# Patient Record
Sex: FEMALE | Race: NATIVE HAWAIIAN OR OTHER PACIFIC ISLANDER | NOT HISPANIC OR LATINO | URBAN - METROPOLITAN AREA
[De-identification: names, ages, dates, MRNs, and addresses within clinical notes are randomized per-mention and may not be internally consistent; named-entity substitution may affect disease eponyms.]

---

## 2019-05-30 PROCEDURE — 81003 URINALYSIS AUTO W/O SCOPE: CPT | Performed by: INTERNAL MEDICINE

## 2019-07-05 PROCEDURE — 87209 SMEAR COMPLEX STAIN: CPT | Performed by: INTERNAL MEDICINE

## 2019-07-05 PROCEDURE — 87177 OVA AND PARASITES SMEARS: CPT | Performed by: INTERNAL MEDICINE

## 2019-07-05 PROCEDURE — 87272 CRYPTOSPORIDIUM AG IF: CPT | Performed by: INTERNAL MEDICINE

## 2019-07-05 PROCEDURE — 87329 GIARDIA AG IA: CPT | Performed by: INTERNAL MEDICINE

## 2019-07-05 PROCEDURE — 83993 ASSAY FOR CALPROTECTIN FECAL: CPT | Performed by: INTERNAL MEDICINE

## 2021-02-21 ENCOUNTER — IMMUNIZATION (OUTPATIENT)
Dept: LAB | Age: 46
End: 2021-02-21

## 2021-02-21 DIAGNOSIS — Z23 NEED FOR VACCINATION: Primary | ICD-10-CM

## 2021-02-21 PROCEDURE — 0011A COVID-19 MODERNA VACCINE: CPT | Performed by: NURSE PRACTITIONER

## 2021-02-21 PROCEDURE — 91301 COVID-19 MODERNA VACCINE: CPT | Performed by: NURSE PRACTITIONER

## 2021-03-21 ENCOUNTER — IMMUNIZATION (OUTPATIENT)
Dept: LAB | Age: 46
End: 2021-03-21
Attending: HOSPITALIST

## 2021-03-21 DIAGNOSIS — Z23 NEED FOR VACCINATION: Primary | ICD-10-CM

## 2021-03-21 PROCEDURE — 0012A COVID-19 MODERNA VACCINE: CPT

## 2021-03-21 PROCEDURE — 91301 COVID-19 MODERNA VACCINE: CPT

## 2024-05-28 ENCOUNTER — LAB ENCOUNTER (OUTPATIENT)
Dept: LAB | Facility: HOSPITAL | Age: 49
DRG: 331 | End: 2024-05-28
Attending: COLON & RECTAL SURGERY
Payer: COMMERCIAL

## 2024-05-28 DIAGNOSIS — Z01.818 PRE-OP TESTING: ICD-10-CM

## 2024-05-28 LAB
ANTIBODY SCREEN: NEGATIVE
RH BLOOD TYPE: POSITIVE

## 2024-05-28 PROCEDURE — 86850 RBC ANTIBODY SCREEN: CPT

## 2024-05-28 PROCEDURE — 86901 BLOOD TYPING SEROLOGIC RH(D): CPT

## 2024-05-28 PROCEDURE — 86900 BLOOD TYPING SEROLOGIC ABO: CPT

## 2024-05-28 PROCEDURE — 36415 COLL VENOUS BLD VENIPUNCTURE: CPT

## 2024-05-30 ENCOUNTER — HOSPITAL ENCOUNTER (INPATIENT)
Facility: HOSPITAL | Age: 49
LOS: 2 days | Discharge: HOME | DRG: 331 | End: 2024-06-01
Attending: COLON & RECTAL SURGERY | Admitting: COLON & RECTAL SURGERY
Payer: COMMERCIAL

## 2024-05-30 ENCOUNTER — ANESTHESIA (OUTPATIENT)
Dept: SURGERY | Facility: HOSPITAL | Age: 49
DRG: 331 | End: 2024-05-30
Payer: COMMERCIAL

## 2024-05-30 ENCOUNTER — ANESTHESIA EVENT (OUTPATIENT)
Dept: SURGERY | Facility: HOSPITAL | Age: 49
DRG: 331 | End: 2024-05-30
Payer: COMMERCIAL

## 2024-05-30 DIAGNOSIS — B36.0 TINEA VERSICOLOR: Primary | ICD-10-CM

## 2024-05-30 DIAGNOSIS — Z01.818 PRE-OP TESTING: ICD-10-CM

## 2024-05-30 DIAGNOSIS — K63.5 POLYP OF COLON, UNSPECIFIED PART OF COLON, UNSPECIFIED TYPE: ICD-10-CM

## 2024-05-30 LAB
B-HCG UR QL: NEGATIVE
GLUCOSE BLDC GLUCOMTR-MCNC: 167 MG/DL (ref 70–99)
RH BLOOD TYPE: POSITIVE

## 2024-05-30 PROCEDURE — 0DBN4ZZ EXCISION OF SIGMOID COLON, PERCUTANEOUS ENDOSCOPIC APPROACH: ICD-10-PCS | Performed by: COLON & RECTAL SURGERY

## 2024-05-30 PROCEDURE — 82962 GLUCOSE BLOOD TEST: CPT

## 2024-05-30 PROCEDURE — 88305 TISSUE EXAM BY PATHOLOGIST: CPT | Performed by: COLON & RECTAL SURGERY

## 2024-05-30 PROCEDURE — 81025 URINE PREGNANCY TEST: CPT

## 2024-05-30 PROCEDURE — 0DJD8ZZ INSPECTION OF LOWER INTESTINAL TRACT, VIA NATURAL OR ARTIFICIAL OPENING ENDOSCOPIC: ICD-10-PCS | Performed by: COLON & RECTAL SURGERY

## 2024-05-30 PROCEDURE — 0DBP4ZZ EXCISION OF RECTUM, PERCUTANEOUS ENDOSCOPIC APPROACH: ICD-10-PCS | Performed by: COLON & RECTAL SURGERY

## 2024-05-30 PROCEDURE — 4A1BXSH MONITORING OF GASTROINTESTINAL VASCULAR PERFUSION USING INDOCYANINE GREEN DYE, EXTERNAL APPROACH: ICD-10-PCS | Performed by: COLON & RECTAL SURGERY

## 2024-05-30 PROCEDURE — 88309 TISSUE EXAM BY PATHOLOGIST: CPT | Performed by: COLON & RECTAL SURGERY

## 2024-05-30 PROCEDURE — S0028 INJECTION, FAMOTIDINE, 20 MG: HCPCS | Performed by: COLON & RECTAL SURGERY

## 2024-05-30 RX ORDER — ROCURONIUM BROMIDE 10 MG/ML
INJECTION, SOLUTION INTRAVENOUS AS NEEDED
Status: DISCONTINUED | OUTPATIENT
Start: 2024-05-30 | End: 2024-05-30 | Stop reason: SURG

## 2024-05-30 RX ORDER — LIDOCAINE HYDROCHLORIDE ANHYDROUS AND DEXTROSE MONOHYDRATE .8; 5 G/100ML; G/100ML
INJECTION, SOLUTION INTRAVENOUS CONTINUOUS PRN
Status: DISCONTINUED | OUTPATIENT
Start: 2024-05-30 | End: 2024-05-30 | Stop reason: SURG

## 2024-05-30 RX ORDER — HYDROMORPHONE HYDROCHLORIDE 1 MG/ML
0.4 INJECTION, SOLUTION INTRAMUSCULAR; INTRAVENOUS; SUBCUTANEOUS EVERY 2 HOUR PRN
Status: DISCONTINUED | OUTPATIENT
Start: 2024-05-30 | End: 2024-06-01

## 2024-05-30 RX ORDER — LEVOFLOXACIN 5 MG/ML
750 INJECTION, SOLUTION INTRAVENOUS EVERY 24 HOURS
Status: COMPLETED | OUTPATIENT
Start: 2024-05-31 | End: 2024-05-31

## 2024-05-30 RX ORDER — SODIUM CHLORIDE, SODIUM LACTATE, POTASSIUM CHLORIDE, CALCIUM CHLORIDE 600; 310; 30; 20 MG/100ML; MG/100ML; MG/100ML; MG/100ML
INJECTION, SOLUTION INTRAVENOUS CONTINUOUS PRN
Status: DISCONTINUED | OUTPATIENT
Start: 2024-05-30 | End: 2024-05-30 | Stop reason: SURG

## 2024-05-30 RX ORDER — HYDROMORPHONE HYDROCHLORIDE 1 MG/ML
0.4 INJECTION, SOLUTION INTRAMUSCULAR; INTRAVENOUS; SUBCUTANEOUS EVERY 5 MIN PRN
Status: DISCONTINUED | OUTPATIENT
Start: 2024-05-30 | End: 2024-05-30 | Stop reason: HOSPADM

## 2024-05-30 RX ORDER — LEVOFLOXACIN 5 MG/ML
500 INJECTION, SOLUTION INTRAVENOUS ONCE
Status: COMPLETED | OUTPATIENT
Start: 2024-05-30 | End: 2024-05-30

## 2024-05-30 RX ORDER — DEXAMETHASONE SODIUM PHOSPHATE 4 MG/ML
VIAL (ML) INJECTION AS NEEDED
Status: DISCONTINUED | OUTPATIENT
Start: 2024-05-30 | End: 2024-05-30 | Stop reason: SURG

## 2024-05-30 RX ORDER — PROCHLORPERAZINE EDISYLATE 5 MG/ML
5 INJECTION INTRAMUSCULAR; INTRAVENOUS EVERY 8 HOURS PRN
Status: DISCONTINUED | OUTPATIENT
Start: 2024-05-30 | End: 2024-06-01

## 2024-05-30 RX ORDER — HYDROMORPHONE HYDROCHLORIDE 1 MG/ML
0.2 INJECTION, SOLUTION INTRAMUSCULAR; INTRAVENOUS; SUBCUTANEOUS EVERY 5 MIN PRN
Status: DISCONTINUED | OUTPATIENT
Start: 2024-05-30 | End: 2024-05-30 | Stop reason: HOSPADM

## 2024-05-30 RX ORDER — MIDAZOLAM HYDROCHLORIDE 1 MG/ML
INJECTION INTRAMUSCULAR; INTRAVENOUS AS NEEDED
Status: DISCONTINUED | OUTPATIENT
Start: 2024-05-30 | End: 2024-05-30 | Stop reason: SURG

## 2024-05-30 RX ORDER — KETOROLAC TROMETHAMINE 30 MG/ML
INJECTION, SOLUTION INTRAMUSCULAR; INTRAVENOUS AS NEEDED
Status: DISCONTINUED | OUTPATIENT
Start: 2024-05-30 | End: 2024-05-30 | Stop reason: SURG

## 2024-05-30 RX ORDER — MORPHINE SULFATE 4 MG/ML
4 INJECTION, SOLUTION INTRAMUSCULAR; INTRAVENOUS EVERY 10 MIN PRN
Status: DISCONTINUED | OUTPATIENT
Start: 2024-05-30 | End: 2024-05-30 | Stop reason: HOSPADM

## 2024-05-30 RX ORDER — POLYETHYLENE GLYCOL 3350 17 G/17G
17 POWDER, FOR SOLUTION ORAL DAILY PRN
Status: DISCONTINUED | OUTPATIENT
Start: 2024-05-30 | End: 2024-06-01

## 2024-05-30 RX ORDER — ACETAMINOPHEN 500 MG
1000 TABLET ORAL ONCE
Status: COMPLETED | OUTPATIENT
Start: 2024-05-30 | End: 2024-05-30

## 2024-05-30 RX ORDER — MORPHINE SULFATE 4 MG/ML
2 INJECTION, SOLUTION INTRAMUSCULAR; INTRAVENOUS EVERY 10 MIN PRN
Status: DISCONTINUED | OUTPATIENT
Start: 2024-05-30 | End: 2024-05-30 | Stop reason: HOSPADM

## 2024-05-30 RX ORDER — FAMOTIDINE 20 MG/1
20 TABLET, FILM COATED ORAL 2 TIMES DAILY
Status: DISCONTINUED | OUTPATIENT
Start: 2024-05-30 | End: 2024-06-01

## 2024-05-30 RX ORDER — HEPARIN SODIUM 5000 [USP'U]/ML
5000 INJECTION, SOLUTION INTRAVENOUS; SUBCUTANEOUS ONCE
Status: COMPLETED | OUTPATIENT
Start: 2024-05-30 | End: 2024-05-30

## 2024-05-30 RX ORDER — HYDROMORPHONE HYDROCHLORIDE 1 MG/ML
0.8 INJECTION, SOLUTION INTRAMUSCULAR; INTRAVENOUS; SUBCUTANEOUS EVERY 2 HOUR PRN
Status: DISCONTINUED | OUTPATIENT
Start: 2024-05-30 | End: 2024-06-01

## 2024-05-30 RX ORDER — SODIUM CHLORIDE, SODIUM LACTATE, POTASSIUM CHLORIDE, CALCIUM CHLORIDE 600; 310; 30; 20 MG/100ML; MG/100ML; MG/100ML; MG/100ML
INJECTION, SOLUTION INTRAVENOUS CONTINUOUS
Status: DISCONTINUED | OUTPATIENT
Start: 2024-05-30 | End: 2024-05-30 | Stop reason: HOSPADM

## 2024-05-30 RX ORDER — OXYCODONE HYDROCHLORIDE 5 MG/1
5 TABLET ORAL EVERY 4 HOURS PRN
Status: DISCONTINUED | OUTPATIENT
Start: 2024-05-30 | End: 2024-06-01

## 2024-05-30 RX ORDER — FAMOTIDINE 10 MG/ML
20 INJECTION, SOLUTION INTRAVENOUS 2 TIMES DAILY
Status: DISCONTINUED | OUTPATIENT
Start: 2024-05-30 | End: 2024-06-01

## 2024-05-30 RX ORDER — METRONIDAZOLE 500 MG/100ML
500 INJECTION, SOLUTION INTRAVENOUS ONCE
Status: COMPLETED | OUTPATIENT
Start: 2024-05-30 | End: 2024-05-30

## 2024-05-30 RX ORDER — KETOROLAC TROMETHAMINE 30 MG/ML
30 INJECTION, SOLUTION INTRAMUSCULAR; INTRAVENOUS EVERY 6 HOURS
Status: DISCONTINUED | OUTPATIENT
Start: 2024-05-30 | End: 2024-05-31

## 2024-05-30 RX ORDER — SENNOSIDES 8.6 MG
17.2 TABLET ORAL NIGHTLY PRN
Status: DISCONTINUED | OUTPATIENT
Start: 2024-05-30 | End: 2024-06-01

## 2024-05-30 RX ORDER — METRONIDAZOLE 500 MG/100ML
500 INJECTION, SOLUTION INTRAVENOUS EVERY 8 HOURS
Status: DISCONTINUED | OUTPATIENT
Start: 2024-05-30 | End: 2024-05-30

## 2024-05-30 RX ORDER — EPHEDRINE SULFATE 50 MG/ML
INJECTION, SOLUTION INTRAVENOUS AS NEEDED
Status: DISCONTINUED | OUTPATIENT
Start: 2024-05-30 | End: 2024-05-30 | Stop reason: SURG

## 2024-05-30 RX ORDER — HYDROMORPHONE HYDROCHLORIDE 1 MG/ML
0.6 INJECTION, SOLUTION INTRAMUSCULAR; INTRAVENOUS; SUBCUTANEOUS EVERY 5 MIN PRN
Status: DISCONTINUED | OUTPATIENT
Start: 2024-05-30 | End: 2024-05-30 | Stop reason: HOSPADM

## 2024-05-30 RX ORDER — MAGNESIUM OXIDE 400 MG/1
400 TABLET ORAL DAILY
Status: DISCONTINUED | OUTPATIENT
Start: 2024-05-30 | End: 2024-06-01

## 2024-05-30 RX ORDER — NALOXONE HYDROCHLORIDE 0.4 MG/ML
0.08 INJECTION, SOLUTION INTRAMUSCULAR; INTRAVENOUS; SUBCUTANEOUS AS NEEDED
Status: DISCONTINUED | OUTPATIENT
Start: 2024-05-30 | End: 2024-05-30 | Stop reason: HOSPADM

## 2024-05-30 RX ORDER — SODIUM CHLORIDE, SODIUM LACTATE, POTASSIUM CHLORIDE, CALCIUM CHLORIDE 600; 310; 30; 20 MG/100ML; MG/100ML; MG/100ML; MG/100ML
2 INJECTION, SOLUTION INTRAVENOUS CONTINUOUS
Status: DISCONTINUED | OUTPATIENT
Start: 2024-05-30 | End: 2024-06-01

## 2024-05-30 RX ORDER — METRONIDAZOLE 500 MG/100ML
500 INJECTION, SOLUTION INTRAVENOUS EVERY 8 HOURS
Status: COMPLETED | OUTPATIENT
Start: 2024-05-30 | End: 2024-05-31

## 2024-05-30 RX ORDER — HYDROMORPHONE HYDROCHLORIDE 1 MG/ML
INJECTION, SOLUTION INTRAMUSCULAR; INTRAVENOUS; SUBCUTANEOUS AS NEEDED
Status: DISCONTINUED | OUTPATIENT
Start: 2024-05-30 | End: 2024-05-30 | Stop reason: SURG

## 2024-05-30 RX ORDER — HEPARIN SODIUM 5000 [USP'U]/ML
5000 INJECTION, SOLUTION INTRAVENOUS; SUBCUTANEOUS EVERY 8 HOURS SCHEDULED
Status: DISCONTINUED | OUTPATIENT
Start: 2024-05-31 | End: 2024-06-01

## 2024-05-30 RX ORDER — ONDANSETRON 2 MG/ML
4 INJECTION INTRAMUSCULAR; INTRAVENOUS EVERY 6 HOURS PRN
Status: DISCONTINUED | OUTPATIENT
Start: 2024-05-30 | End: 2024-06-01

## 2024-05-30 RX ORDER — SODIUM CHLORIDE, SODIUM LACTATE, POTASSIUM CHLORIDE, CALCIUM CHLORIDE 600; 310; 30; 20 MG/100ML; MG/100ML; MG/100ML; MG/100ML
INJECTION, SOLUTION INTRAVENOUS CONTINUOUS
Status: DISCONTINUED | OUTPATIENT
Start: 2024-05-30 | End: 2024-05-30

## 2024-05-30 RX ORDER — ONDANSETRON 2 MG/ML
INJECTION INTRAMUSCULAR; INTRAVENOUS AS NEEDED
Status: DISCONTINUED | OUTPATIENT
Start: 2024-05-30 | End: 2024-05-30 | Stop reason: SURG

## 2024-05-30 RX ORDER — MORPHINE SULFATE 10 MG/ML
6 INJECTION, SOLUTION INTRAMUSCULAR; INTRAVENOUS EVERY 10 MIN PRN
Status: DISCONTINUED | OUTPATIENT
Start: 2024-05-30 | End: 2024-05-30 | Stop reason: HOSPADM

## 2024-05-30 RX ORDER — LIDOCAINE HYDROCHLORIDE 10 MG/ML
INJECTION, SOLUTION EPIDURAL; INFILTRATION; INTRACAUDAL; PERINEURAL AS NEEDED
Status: DISCONTINUED | OUTPATIENT
Start: 2024-05-30 | End: 2024-05-30 | Stop reason: SURG

## 2024-05-30 RX ORDER — OXYCODONE HYDROCHLORIDE 5 MG/1
10 TABLET ORAL EVERY 4 HOURS PRN
Status: DISCONTINUED | OUTPATIENT
Start: 2024-05-30 | End: 2024-06-01

## 2024-05-30 RX ADMIN — SODIUM CHLORIDE, SODIUM LACTATE, POTASSIUM CHLORIDE, CALCIUM CHLORIDE: 600; 310; 30; 20 INJECTION, SOLUTION INTRAVENOUS at 08:17:00

## 2024-05-30 RX ADMIN — KETOROLAC TROMETHAMINE 30 MG: 30 INJECTION, SOLUTION INTRAMUSCULAR; INTRAVENOUS at 12:32:00

## 2024-05-30 RX ADMIN — SODIUM CHLORIDE, SODIUM LACTATE, POTASSIUM CHLORIDE, CALCIUM CHLORIDE: 600; 310; 30; 20 INJECTION, SOLUTION INTRAVENOUS at 11:30:00

## 2024-05-30 RX ADMIN — EPHEDRINE SULFATE 10 MG: 50 INJECTION, SOLUTION INTRAVENOUS at 09:00:00

## 2024-05-30 RX ADMIN — EPHEDRINE SULFATE 5 MG: 50 INJECTION, SOLUTION INTRAVENOUS at 08:58:00

## 2024-05-30 RX ADMIN — EPHEDRINE SULFATE 15 MG: 50 INJECTION, SOLUTION INTRAVENOUS at 08:52:00

## 2024-05-30 RX ADMIN — ONDANSETRON 4 MG: 2 INJECTION INTRAMUSCULAR; INTRAVENOUS at 08:22:00

## 2024-05-30 RX ADMIN — MIDAZOLAM HYDROCHLORIDE 2 MG: 1 INJECTION INTRAMUSCULAR; INTRAVENOUS at 08:18:00

## 2024-05-30 RX ADMIN — DEXAMETHASONE SODIUM PHOSPHATE 8 MG: 4 MG/ML VIAL (ML) INJECTION at 08:22:00

## 2024-05-30 RX ADMIN — HYDROMORPHONE HYDROCHLORIDE 0.5 MG: 1 INJECTION, SOLUTION INTRAMUSCULAR; INTRAVENOUS; SUBCUTANEOUS at 11:52:00

## 2024-05-30 RX ADMIN — SODIUM CHLORIDE, SODIUM LACTATE, POTASSIUM CHLORIDE, CALCIUM CHLORIDE: 600; 310; 30; 20 INJECTION, SOLUTION INTRAVENOUS at 11:50:00

## 2024-05-30 RX ADMIN — ROCURONIUM BROMIDE 10 MG: 10 INJECTION, SOLUTION INTRAVENOUS at 10:38:00

## 2024-05-30 RX ADMIN — LEVOFLOXACIN 500 MG: 5 INJECTION, SOLUTION INTRAVENOUS at 08:17:00

## 2024-05-30 RX ADMIN — ROCURONIUM BROMIDE 20 MG: 10 INJECTION, SOLUTION INTRAVENOUS at 08:32:00

## 2024-05-30 RX ADMIN — SODIUM CHLORIDE, SODIUM LACTATE, POTASSIUM CHLORIDE, CALCIUM CHLORIDE: 600; 310; 30; 20 INJECTION, SOLUTION INTRAVENOUS at 08:24:00

## 2024-05-30 RX ADMIN — EPHEDRINE SULFATE 5 MG: 50 INJECTION, SOLUTION INTRAVENOUS at 08:54:00

## 2024-05-30 RX ADMIN — LIDOCAINE HYDROCHLORIDE ANHYDROUS AND DEXTROSE MONOHYDRATE 1 MG/MIN: .8; 5 INJECTION, SOLUTION INTRAVENOUS at 09:00:00

## 2024-05-30 RX ADMIN — LIDOCAINE HYDROCHLORIDE 50 MG: 10 INJECTION, SOLUTION EPIDURAL; INFILTRATION; INTRACAUDAL; PERINEURAL at 08:22:00

## 2024-05-30 RX ADMIN — METRONIDAZOLE 500 MG: 500 INJECTION, SOLUTION INTRAVENOUS at 08:20:00

## 2024-05-30 RX ADMIN — EPHEDRINE SULFATE 15 MG: 50 INJECTION, SOLUTION INTRAVENOUS at 08:56:00

## 2024-05-30 RX ADMIN — ROCURONIUM BROMIDE 10 MG: 10 INJECTION, SOLUTION INTRAVENOUS at 09:54:00

## 2024-05-30 RX ADMIN — SODIUM CHLORIDE, SODIUM LACTATE, POTASSIUM CHLORIDE, CALCIUM CHLORIDE: 600; 310; 30; 20 INJECTION, SOLUTION INTRAVENOUS at 08:52:00

## 2024-05-30 RX ADMIN — ROCURONIUM BROMIDE 20 MG: 10 INJECTION, SOLUTION INTRAVENOUS at 09:02:00

## 2024-05-30 RX ADMIN — ROCURONIUM BROMIDE 10 MG: 10 INJECTION, SOLUTION INTRAVENOUS at 11:30:00

## 2024-05-30 NOTE — ANESTHESIA PREPROCEDURE EVALUATION
Anesthesia PreOp Note    HPI:     Roshni Pierce is a 48 year old female who presents for preoperative consultation requested by: Titi Chen MD    Date of Surgery: 2024    Procedure(s):  Proctoscopy, laparoscopic sigmoid resection, possible open laparotomy  Exploratory laparotomy  Proctoscopy  Indication: Sigmoid cancer, sigmoid polyp    Relevant Problems   No relevant active problems       NPO:  Last Liquid Consumption Date: 24  Last Liquid Consumption Time: 515  Last Solid Consumption Date: 24  Last Solid Consumption Time:   Last Liquid Consumption Date: 24          History Review:  Patient Active Problem List    Diagnosis Date Noted    Tinea versicolor 2014       Past Medical History:    Family history of premature CAD    father - 53    IBS (irritable bowel syndrome)    Diarrhea predominant. Celiac panel and CRP neg. GI Hung. trial low fodmap diet    Visual impairment       Past Surgical History:   Procedure Laterality Date    Colonoscopy                        Repair of nasal septum      deviated    Tonsillectomy  1977    [place] teeth removed  1989    x4       Medications Prior to Admission   Medication Sig Dispense Refill Last Dose    ketoconazole 2 % External Cream Apply twice per day 30 g 0 More than a month    Probiotic Product (PROBIOTIC-10 OR) Take 1 tablet by mouth daily.   More than a month    Vitamin D3 25 MCG (1000 UT) Oral Tab Take 1 tablet (1,000 Units total) by mouth daily as needed (in winter).   More than a month     Current Facility-Administered Medications Ordered in Epic   Medication Dose Route Frequency Provider Last Rate Last Admin    lactated ringers infusion   Intravenous Continuous Titi Chen MD 20 mL/hr at 24 0606 New Bag at 24 0606    levoFLOXacin in dextrose 5% (Levaquin) 500 mg/100mL IVPB premix 500 mg  500 mg Intravenous Once Titi Chen MD        metRONIDAZOLE in sodium chloride 0.79% (Flagyl) 5  mg/mL IVPB premix 500 mg  500 mg Intravenous Once Gustavo, MD Titi         No current [facility]-ordered outpatient medications on file.       Allergies   Allergen Reactions    Penicillins RASH     No skin blisters, no organ involvement       Family History   Problem Relation Age of Onset    Heart Attack Father 53    Anxiety Father         related to son's death    Heart Disorder Mother 67        CABG    No Known Problems Daughter     No Known Problems Son     Asthma Maternal Grandmother     Heart Attack Maternal Grandfather         68    Breast Cancer Paternal Grandmother 80    Heart Attack Paternal Grandfather 79    Anxiety Sister         panic attack    Diabetes Brother         type 1    Other (Glioblastoma) Brother 39    No Known Problems Sister     No Known Problems Brother     Breast Cancer Paternal Aunt 52     Social History     Socioeconomic History    Marital status:    Occupational History    Occupation: teacher HS     Comment: sub   Tobacco Use    Smoking status: Never    Smokeless tobacco: Never   Vaping Use    Vaping status: Never Used   Substance and Sexual Activity    Alcohol use: Yes     Alcohol/week: 3.0 - 5.0 standard drinks of alcohol     Types: 3 - 5 Standard drinks or equivalent per week     Comment: 1 drink 3-5 times/wk    Drug use: Never    Sexual activity: Yes     Partners: Male     Comment: 1/yr, no sti   Other Topics Concern     Service No    Blood Transfusions No    Caffeine Concern No     Comment: 1/d    Occupational Exposure No    Hobby Hazards No    Sleep Concern No    Stress Concern No    Weight Concern No    Special Diet No    Back Care No    Exercise Yes     Comment: cardio, trx, pickle ball/tennis, resistance training daily    Bike Helmet No    Seat Belt No    Self-Exams Yes       Available pre-op labs reviewed.  Lab Results   Component Value Date    URINEPREG Negative 05/30/2024             Vital Signs:  Body mass index is 19.08 kg/m².   height is 1.689 m (5' 6.5\") and  weight is 54.4 kg (120 lb). Her oral temperature is 99.1 °F (37.3 °C). Her blood pressure is 139/86 and her pulse is 100. Her respiration is 18 and oxygen saturation is 99%.   Vitals:    05/22/24 1630 05/30/24 0600   BP:  139/86   Pulse:  100   Resp:  18   Temp:  99.1 °F (37.3 °C)   TempSrc:  Oral   SpO2:  99%   Weight: 55.8 kg (123 lb) 54.4 kg (120 lb)   Height: 1.689 m (5' 6.5\") 1.689 m (5' 6.5\")        Anesthesia Evaluation     Patient summary reviewed and Nursing notes reviewed    Airway   Mallampati: II  TM distance: >3 FB  Neck ROM: full  Dental      Pulmonary - negative ROS and normal exam   Cardiovascular - negative ROS and normal exam  Exercise tolerance: good    Neuro/Psych    (+)  anxiety/panic attacks,        GI/Hepatic/Renal      Comments: Irritable bowel syndrome.  Sigmoid polyp    Endo/Other - negative ROS   Abdominal  - normal exam                 Anesthesia Plan:   ASA:  2  Plan:   General  Airway:  ETT  Informed Consent Plan and Risks Discussed With:  Patient  Use of Blood Products Discussed With:  Patient  Blood Product Use Consented        I have informed Roshni Pierce and/or legal guardian or family member of the nature of the anesthetic plan, benefits, risks including possible dental damage if relevant, major complications, and any alternative forms of anesthetic management.   All of the patient's questions were answered to the best of my ability. The patient desires the anesthetic management as planned.  Jailyn Sosa MD  5/30/2024 7:16 AM  Present on Admission:  **None**

## 2024-05-30 NOTE — ANESTHESIA PROCEDURE NOTES
Airway  Date/Time: 5/30/2024 8:23 AM  Urgency: elective    Airway not difficult    General Information and Staff    Patient location during procedure: OR  Anesthesiologist: Jailyn Sosa MD  Performed: anesthesiologist   Performed by: Jailyn Sosa MD  Authorized by: Jailyn Sosa MD      Indications and Patient Condition  Indications for airway management: anesthesia  Spontaneous Ventilation: absent  Sedation level: deep  Preoxygenated: yes  Patient position: sniffing  MILS not maintained throughout  Mask difficulty assessment: 0 - not attempted    Final Airway Details  Final airway type: endotracheal airway      Successful airway: ETT  Cuffed: yes   Successful intubation technique: direct laryngoscopy  Facilitating devices/methods: rapid sequence intubation  Endotracheal tube insertion site: oral  Blade: Ashwini  Blade size: #4  ETT size (mm): 7.0    Cormack-Lehane Classification: grade I - full view of glottis  Placement verified by: capnometry   Cuff volume (mL): 5  Measured from: lips  ETT to lips (cm): 22  Number of attempts at approach: 1  Number of other approaches attempted: 0

## 2024-05-30 NOTE — H&P
On license of [facility] and [facility]  Office Visit - Colon and Rectal Surgery  Titi Chen MD      CHIEF COMPLAINT: Patient presents for surgery for colon polyp      HISTORY OF PRESENT ILLNESS:  Roshni Pierce is a 48 year old female who presents for surgery for colon polyp.    INTERVAL HISTORY   She has completed CT C/A/P and is here today for rigid proctoscopy to localize lesion.  She has not developed any new symptoms since last visit.    INITIAL HISTORY (2024)  She recently underwent routine colonoscopy for colon cancer screening last week.  A polyp was noted in the distal sigmoid colon and was not amenable to endoscopic removal.  She has a history of IBS in the last several years.  She has not noted change in bowel habits, abdominal pain or blood in stool.    Bowel habits:   Frequency: twice each morning (small and loose and then normally formed)  Another 1-2 BMs at night when stressed  Consistency: loose  Continence: occasional incontinence if stool really loose later in the evening    HISTORY:  Past Medical History:   Diagnosis Date   Anxiety   COVID    Family history of breast cancer in sister   55 - not genetic, PGM and Paunt w/breast ca   Family history of premature CAD   father - 53   IBS (irritable bowel syndrome)   Diarrhea predominant. Celiac panel and CRP neg. GI Hung. trial low fodmap diet     Past Surgical History:   Procedure Laterality Date   COLONOSCOPY N/A 3/25/2024   Procedure: COLONOSCOPY, with polypectomy; Surgeon: Sunny Naqvi MD; Location: [facility]               REPAIR OF NASAL SEPTUM 1992   deviated   SINUS SURGERY   TONSILLECTOMY 1977   [place] TEETH REMOVED 1989   x4     Family History   Problem Relation Age of Onset   Heart Disorder Mother 67   CABG   Heart Attack Father 53   Anxiety Father   related to son's death   Anxiety Sister   panic attack   Breast Cancer Sister 56   not genetic stage 1A - just surgery   Diabetes Brother   type 1   Other  (Glioblastoma) Brother 39   No Known Problems Brother   Asthma Maternal Grandmother   Heart Attack Maternal Grandfather   68   Breast Cancer Paternal Grandmother 80   Heart Attack Paternal Grandfather 79   No Known Problems Daughter   No Known Problems Son   Breast Cancer Paternal Aunt 52     Social History  Tobacco Use  Smoking status: Never  Smokeless tobacco: Never  Vaping Use  Vaping Use: Never used  Alcohol use: Yes  Alcohol/week: 3.0 - 5.0 standard drinks of alcohol  Types: 3 - 5 Standard drinks or equivalent per week  Comment: 1 drink 3-5 times/wk  Drug use: Never      CURRENT MEDICATIONS:   Current Outpatient Medications   Medication Sig Dispense Refill   neomycin 500 MG Oral Tab Take 2PO the day before surgery at 5,7,9 PM 6 tablet 0   metRONIDAZOLE (FLAGYL) 500 MG Oral Tab Take 1PO the day before surgery at 5,7,9 PM 3 tablet 0   zolpidem 5 MG Oral Tab Take 1 tablet (5 mg total) by mouth nightly as needed for Sleep. 20 tablet 0   ketoconazole 2 % External Cream Apply twice per day 30 g 0   Probiotic Product (PROBIOTIC-10 OR) Take 1 tablet by mouth daily.   Vitamin D3 25 MCG (1000 UT) Oral Tab Take 1,000 Units by mouth daily as needed (in winter).         ALLERGIES:  Penicillins    REVIEW OF SYSTEMS:  Constitutional: normal  Eyes: corrective lenses  Ears/Nose/Throat: normal  Respiratory: normal  Cardiac/Vascular: normal  GI: see HPI  : normal  Musculoskeletal: normal  Skin: normal  Neurologic: normal  Psychiatric: occasional anxiety  Endocrine: normal  Hematology/Lymphatics: normal  Allergy/Immunology: normal    PHYSICAL EXAM:   Ht 5' 6.5\" (1.689 m)  Wt 125 lb (56.7 kg)  BMI 19.87 kg/m²   Body mass index is 19.87 kg/m².    CONSTITUTIONAL: awake, alert, cooperative, no apparent distress, and appears stated age  EYES: Lids and lashes normal, sclera clear, conjunctiva normal  ENT: Normocephalic, without obvious abnormality, atraumatic  NECK: Supple, symmetrical, trachea midline, no adenopathy, thyroid  symmetric, not enlarged and no tenderness  HEMATOLOGIC/LYMPHATICS: No cervical lymphadenopathy, no supraclavicular lymphadenopathy, no inguinal lymphadenopathy  LUNGS: No increased work of breathing, good air exchange, clear to auscultation bilaterally, no crackles or wheezing  CARDIOVASCULAR: Normal apical impulse, regular rate and rhythm, and no murmur noted, no pedal edema  ABDOMEN: No scars, normal bowel sounds, soft, non-distended, non-tender, no masses palpated, no hepatosplenomegaly    Procedure: Rigid proctoscopy    Indication: Rectal cancer     Technique:   Patient placed in kneeling position. Digital rectal examination was performed. The rigid proctoscope was inserted to 15 cm. Careful inspection of the mucosa was made during withdrawal of the proctoscope. Bowel preparation was excellent. The patient tolerated the procedure well.     Findings:   RECTAL:  External skin is normal  Anal tags are not noted  Prolapse is not noted with straining  Resting tone is normal  Squeeze tone is normal  Mass is not palpable    PROCTOSCOPY:  Begins 13 cm above verge  Beginning of polyp seen on anterior wall, occupied half circumference  Tattoo noted distal to mass    MUSCULOSKELETAL: Full range of motion noted. Motor strength is 5 out of 5 all extremities bilaterally.   SKIN: no rashes and no jaundice  PSYCHIATRIC:   Orientation: normal  Appearance: normal  Behavior: normal  Attitude toward examiner: normal  Affect: normal  Judgment: Normal    DATA:   COLONOSCOPY 3/25/2024 - Dr. Naqvi  PREOPERATIVE DIAGNOSIS  colon cancer screening   POSTOPERATIVE DIAGNOSIS:  5 mm cecum polyp and 7 mm sigmoid colon polyp, both removed via cold biopsy forceps.  Circumferential mass, nonobstructing, soft consistency on biopsies, at 15 cm at the rectosigmoid junction. The mass was approximately 2 - 3 cm in length, and too broad based with extensive superficial mucosal involvement of the surrounding tissue, to allow for endoscopic resection.    1.5 ml of ink injected to naye the rectosigmoid site for future surgical resection.  PROCEDURE AND FINDINGS:...   Findings included 5 mm cecum polyp and 7 mm sigmoid colon polyp, both removed via cold biopsy forceps. There was a circumferential mass, nonobstructing, soft consistency on biopsies, at 15 cm at the rectosigmoid junction. The mass was approximately 2 - 3 cm in length, and too broad based with extensive superficial mucosal involvement of the surrounding tissue, to allow for endoscopic resection. 1.5 ml of ink injected to naye the rectosigmoid site for future surgical resection. On retroflexion of the scope in the anorectum, normal internal hemorrhoids were noted.   RECOMMENDATIONS   1. The patient will be provided with a written summary of today's endoscopic findings.  2. The patient will be notified with biopsy results in 2 - 4 working days.   3. Referral given for surgical evaluation for resection of the rectosigmoid mass.    Final Diagnosis   A. Cecal polyp:  -Tubular adenoma.  -Negative for malignancy.    B. Sigmoid colon polyp:  -Tubular adenoma.  -Negative for malignancy.    C. Rectosigmoid junction mass at 15 cm biopsy:  -Tubulovillous adenoma with focal, superficial high grade dysplasia. See comment      Electronically signed by Cullen Desai MD on 4/3/2024 at 9:32 AM   Comments [facility]   Additional levels of the rectosigmoid mass biopsy are reviewed. The biopsy shows fragments of tubulovillous adenoma with focal, superficial high grade dysplasia. There is no evidence of invasive carcinoma in the submitted material. However, since this is a superficial biopsy of a larger mass lesion, a more severe unsampled process cannot be excluded. Clinical and endoscopic correlation is suggested.     CEA  4/02/2024 - 0.7    CT C/A/P 4/11/2024  COMBINED IMPRESSION:   1. Moderate segmental thickening of the distal sigmoid colon, in keeping with the documented mass   seen on colonoscopy.   2. No  evidence of metastatic disease to the chest, abdomen, or pelvis.     ASSESSMENT AND PLAN:  Colon polyp  Proximal rectum at 13 cm on rigid proctoscopy    She is ready to proceed with laparoscopic LAR, possible stoma (stoma not likely needed as anastomosis expected to be proximal to anterior peritoneal reflection)    Procedure, indications, risks, benefits, and alternatives discussed with patient and . All questions answered. They understand and she wishes to proceed.      Prior A/P  Colon polyp,   Sigmoid colon at 15 cm  Circumferential, soft  Biopsy shows TVA, malignancy cannot be excluded on superficial biopsies    Advise  CT C/A/P to assess primary mass and potential metastatic disease  If primary site not visible, will need rigid proctoscopy to confirm lesion is proximal to rectum for surgical planning  Anticipate she will need laparoscopic sigmoid colectomy        The patient was educated regarding the condition, treatment options, and instructed in the above treatment plan.    Titi Chen MD, MD, FACS, FASCRS

## 2024-05-30 NOTE — CONSULTS
[facility]  part of [facility]     [facility] Hospitalist Consult note     CC: Post op management     PCP: Denis Martines MD    Consult from Dr Chen, medical management     Assessment and Plan       Roshni Pierce is a 48 year old female with PMH sig for IBS, family history of CAD, and recent diagnosis of sigmoid cancer who presented for laparoscopic sigmoid colectomy and anastomosis.      Sigmoid polyp/ malignancy with path showing high grade dysplasia  -await final path from sigmoid colectomy with primary anastomosis  -monitor expected Acute blood loss anemia,preop hgb 13.3  -diet per surgery  -prn IV and po pain meds for post op pain control    FEN: IVF per protocol, lytes in AM, diet per surgery    PPX; SCd, HSQ    Dispo full code, pending course     Outpatient records reviewed confirming patient's medical history and medications.     Further recommendations pending patient's clinical course.  [facility] hospitalist to continue to follow patient while in house    Patient and/or patient's family given opportunity to ask questions and note understanding and agreeing with therapeutic plan as outlined    Note: This chart was prepared using voice recognition software and may contain unintended word substitution errors.         Thank You,  Kassie Sidhu MD 5/30/24  [facility] Hospitalist     Answering Service number: [phone]    HPI     Roshni Pierce is a 48 year old female with PMH sig for IBS, family history of CAD, and recent diagnosis of sigmoid cancer who presented for laparoscopic sigmoid colectomy and anastomosis.      She had routine screening colonoscopy in April 2024 and had large rectosigmoid polyp that was not amenable to removal and pathology showing superficial high-grade dysplasia.  Presented today for removal.  Postop patient without nausea or vomiting.  No chest pain or shortness of breath.  Pain currently controlled with IV pain meds.    Review of Systems  12 point systems reviewed,  please see HPI for pertinent positives, otherwise negative    History     PMH  Past Medical History:    Family history of premature CAD    father - 53    IBS (irritable bowel syndrome)    Diarrhea predominant. Celiac panel and CRP neg. GI Hung. trial low fodmap diet    Visual impairment        PSH  Past Surgical History:   Procedure Laterality Date    Colonoscopy        2003      2005      2009    Repair of nasal septum  1992    deviated    Tonsillectomy  1977    [place] teeth removed  1989    x4        ALL:  Allergies   Allergen Reactions    Penicillins RASH     No skin blisters, no organ involvement        Home Medications:  No outpatient medications have been marked as taking for the 24 encounter (Hospital Encounter).       Soc Hx  Social History     Tobacco Use    Smoking status: Never    Smokeless tobacco: Never   Substance Use Topics    Alcohol use: Yes     Alcohol/week: 3.0 - 5.0 standard drinks of alcohol     Types: 3 - 5 Standard drinks or equivalent per week     Comment: 1 drink 3-5 times/wk        Fam Hx  Family History   Problem Relation Age of Onset    Heart Attack Father 53    Anxiety Father         related to son's death    Heart Disorder Mother 67        CABG    No Known Problems Daughter     No Known Problems Son     Asthma Maternal Grandmother     Heart Attack Maternal Grandfather         68    Breast Cancer Paternal Grandmother 80    Heart Attack Paternal Grandfather 79    Anxiety Sister         panic attack    Diabetes Brother         type 1    Other (Glioblastoma) Brother 39    No Known Problems Sister     No Known Problems Brother     Breast Cancer Paternal Aunt 52           Objective     Temp: 98.3 °F (36.8 °C)  Pulse: 97  Resp: 13  BP: 108/62    Exam  Gen: No acute distress, alert and oriented x3  Neck Supple, no JVD  Pulm: Lungs clear, normal respiratory effort, No wheezing or crackles  CV: Heart with regular rate and rhythm, No murmurs, rubs, gallops  Abd: Abdomen soft,  tender as expected, hypoactive BS   MSK:   no clubbing, no cyanosis.  No Lower extremity edema  Skin: no rashes or lesions, well perfused  Psych: mood stable, cooperative  Neuro: No focal deficits    Diagnostic Data:    CBC/Chem  No results for input(s): \"WBC\", \"HGB\", \"MCV\", \"PLT\", \"BAND\", \"INR\" in the last 168 hours.    Invalid input(s): \"LYM#\", \"MONO#\", \"BASOS#\", \"EOSIN#\"    No results for input(s): \"NA\", \"K\", \"CL\", \"CO2\", \"BUN\", \"CREATSERUM\", \"GLU\", \"CA\", \"CAION\", \"MG\", \"PHOS\" in the last 168 hours.    No results for input(s): \"ALT\", \"AST\", \"ALB\", \"AMYLASE\", \"LIPASE\", \"LDH\" in the last 168 hours.    Invalid input(s): \"ALPHOS\", \"TBIL\", \"DBIL\", \"TPROT\"    No results for input(s): \"TROP\" in the last 168 hours.         Radiology: No results found.

## 2024-05-30 NOTE — ANESTHESIA POSTPROCEDURE EVALUATION
Patient: Roshni Pierce    Procedure Summary       Date: 05/30/24 Room / Location: [facility] MAIN OR 04 / [facility] MAIN OR    Anesthesia Start: 0816 Anesthesia Stop: 1313    Procedure: Proctoscopy, laparoscopic sigmoid resection (Abdomen) Diagnosis: (Sigmoid cancer, sigmoid polyp)    Surgeons: Titi Chen MD Anesthesiologist: Jailyn Sosa MD    Anesthesia Type: general ASA Status: 2            Anesthesia Type: general    Vitals Value Taken Time   /62 05/30/24 1333   Temp 98.3 °F (36.8 °C) 05/30/24 1309   Pulse 94 05/30/24 1336   Resp 15 05/30/24 1336   SpO2 100 % 05/30/24 1336   Vitals shown include unfiled device data.    [facility] AN Post Evaluation:   Patient Evaluated in PACU  Patient Participation: complete - patient participated  Level of Consciousness: awake and alert  Pain Score: 0  Pain Management: adequate  Airway Patency:patent  Yes    Nausea/Vomiting: none  Cardiovascular Status: acceptable  Respiratory Status: acceptable  Postoperative Hydration acceptable      Jailyn Sosa MD  5/30/2024 1:37 PM

## 2024-05-30 NOTE — OPERATIVE REPORT
Operative Note    Patient Name: Roshni Pierce    Date of Surgery: 05/30/24     Preoperative Diagnosis: Sigmoid cancer, sigmoid polyp    Postoperative Diagnosis: same    Primary Surgeon: Titi Chen MD    Assistant: Mr. Jessica Ugarte    Procedures: Laparoscopic sigmoid colectomy, colorectal anastomosis,   mobilization of splenic flexure, in vivo fluorescence angioagraphy    Surgical Findings: large polyp in proximal rectum, 5 cm distal margin    Anesthesia: General    Complications: none    Implants: none    Specimen: Sigmoid colon, rectum ,and distal donut    Drains: none    Condition: good    Estimated Blood Loss: 10 mL    DESCRIPTION OF PROCEDURE    INDICATION   The patient is a 48 year old year old female with a large colon polyp in region of rectosigmoid junction.     The procedure, indications, risks, benefits, and alternatives were discussed with the patient prior to the procedure. All questions were answered, and the patient wished to proceed.    TECHNIQUE  The patient was taken to the operating room and placed supine on the operating table. General anesthesia was induced and he was then repositioned in modified lithotomy using Vivek Flakito stirrups with appropriate attention to all joints and pressure points. The abdomen was prepared with Chloraprep and draped in the usual sterile fashion. Timeout was called to reconfirm the patient's identity, diagnosis, planned procedure, and completeness of preoperative preparations.    The procedure began with placement of a Giana cannula at the umbilicus. This was done using an open technique. The abdomen was safely entered without injury to underlying viscera. A 0-Vicryl pursestring suture was placed at this location to allow for maintenance of pneumoperitoneum and later for closure of the fascia at the end of the operation. Additional cannulas were placed. Three 5 mm cannulas were placed with 1 in the left lower quadrant and 2 on the right side of the  abdomen. We then placed our last cannula, 12 mm size, in the suprapubic position.    We then placed the patient in Trendelenburg with the right side down. Transverse colon was placed up into the upper abdomen. Survey of the abdomen did not demonstrate any evidence of carcinomatosis or ascites. The mesentery to the sigmoid colon was identified. An incision was made at the sacral promontory and then carried up over the inferior mesenteric artery pedicle and towards the ligament of Treitz on the left lateral aspect. The Enseal was used to dissect through the tissue and divide with hemostasis. We dissected the left mesocolon off of the retroperitoneum and were able to identify the left ureter and gonadal vessels which were pushed posteriorly free from injury. The MELCHOR pedicle was then dissected and we were able to divide the MELCHOR using Enseal with excellent hemostasis just distal to the takeoff of the left colic artery. The IMV was divided at this level as well. We then continued dissection underneath the sigmoid mesocolon laterally to the abdominal sidewall and cephalad over the left kidney to reach a point under the splenic flexure.    Having completed the medial phase of dissection, we then divided the lateral peritoneal attachments at the white line of Toldt. The splenic flexure was then taken down sharply with use of the Enseal without injury to the spleen or tail of the pancreas. The mesentery to the splenic flexure was preserved. Having completed the abdominal phase of the operation, attention was then turned towards the pelvic phase of the operation.    The lateral peritoneal attachments of the rectum were divided down to the upper third of the rectum. This allowed us to reach to a point in the proximal rectum just distal to the confluent layer of tenia. The mesorectum was elevated off of the presacral space by bluntly dissecting in the plane between the fascia propria of the rectum and Waldeyer fascia. Once the  rectum was circumferentially mobilized , the mesorectum was divided at this level using Enseal. We then divided the rectum at this level using the Insignia 45 stapler with 2 firings.    The proximal margin which easily reached down to the pelvis was identified and marked. The remaining mesentery and the proximal colon at the proximal line of resection was divided with the Enseal.  ICG fluorescence angiography was performed using Helpr system. Excellent perfusion was noted at the proposed/prepared proximal and distal lines of resection.     ICG fluorescence angiography was repeated after the anastomosis was completed and again showed excellent perfusion to proximal and distal limbs of the anastomosis.    The specimen was extracted out through the suprapubic incision which was lengthened along the existing scar. The Gilberto wound retractor was placed and then the specimen was exteriorized. The colon was then divided and a 29 mm anvil was placed through the open end of the colon and brought out through a colotomy on the antimesenteric wall of the colon. The open end of the colon was then stapled closed using another firing of the [place] stapler. A 3-0 Vicryl U-stitch was placed at the stem of anvil to help support it during docking. The proximal colon was irrigated and returned to the abdominal cavity.    The pneumoperitoneum was reestablished, and the proximal colon was oriented to lie without torsion. The cartridge of the 29 mm circular stapler was advanced transanally up to the top of the rectal stump under laparoscopic vision. The spike of the cartridge was advanced through the staple lines in the rectal stump. The anvil and cartridge were mated and closed. Fifteen seconds were allowed to pass to allow tissue compression and optimal staple formation. The orientation of the mesentery to reach the pelvis without tension or torsion was again confirmed, as well as ensuring that there was no small bowel herniating under  the left colon. After all inspections were satisfactory, the circular stapler was fired. Both donuts were inspected and found to be circumferentially intact. The anastomosis was then tested with air insufflation under saline submersion using the proctoscope. With proximal compression, the anastomosis distended well and transanal passage of gas occurred around the anastomosis tested. There were no air bubbles, and therefore, a secure anastomosis had been achieved.     We then removed the cannulas and closed the incisions. The transverse suprapubic incision was closed using running #1 PDS suture. The umbilical incision was closed by tying the previously placed 0 Vicryl pursestring suture. Subcutaneous tissues were irrigated with saline and then hemostasis secured with cautery. Skin was closed using 4-0 Vicryl subcuticular sutures. The skin was cleansed and Dermabond was applied.     The patient was then allowed to emerge from anesthesia without incident. The patient was extubated in the operating room and taken to the recovery room in satisfactory condition.    (Mr. Jessica Ugarte's skilled assistance was necessary for patient positioning, prepping, instrument passing and holding, retraction, suturing, and camera holding.)      _____________________________________   Attending Surgeon: Titi Chen MD   Dictated By: iTti Chen MD

## 2024-05-31 LAB
ANION GAP SERPL CALC-SCNC: 6 MMOL/L (ref 0–18)
BASOPHILS # BLD AUTO: 0.02 X10(3) UL (ref 0–0.2)
BASOPHILS NFR BLD AUTO: 0.2 %
BUN BLD-MCNC: <5 MG/DL (ref 9–23)
CALCIUM BLD-MCNC: 8.9 MG/DL (ref 8.7–10.4)
CHLORIDE SERPL-SCNC: 110 MMOL/L (ref 98–112)
CO2 SERPL-SCNC: 26 MMOL/L (ref 21–32)
CREAT BLD-MCNC: 0.8 MG/DL
DEPRECATED RDW RBC AUTO: 40.7 FL (ref 35.1–46.3)
EGFRCR SERPLBLD CKD-EPI 2021: 90 ML/MIN/1.73M2 (ref 60–?)
EOSINOPHIL # BLD AUTO: 0 X10(3) UL (ref 0–0.7)
EOSINOPHIL NFR BLD AUTO: 0 %
ERYTHROCYTE [DISTWIDTH] IN BLOOD BY AUTOMATED COUNT: 12.4 % (ref 11–15)
GLUCOSE BLD-MCNC: 118 MG/DL (ref 70–99)
HCT VFR BLD AUTO: 35.2 %
HGB BLD-MCNC: 11.8 G/DL
IMM GRANULOCYTES # BLD AUTO: 0.03 X10(3) UL (ref 0–1)
IMM GRANULOCYTES NFR BLD: 0.3 %
LYMPHOCYTES # BLD AUTO: 1.45 X10(3) UL (ref 1–4)
LYMPHOCYTES NFR BLD AUTO: 14.1 %
MAGNESIUM SERPL-MCNC: 1.5 MG/DL (ref 1.6–2.6)
MCH RBC QN AUTO: 30.3 PG (ref 26–34)
MCHC RBC AUTO-ENTMCNC: 33.5 G/DL (ref 31–37)
MCV RBC AUTO: 90.3 FL
MONOCYTES # BLD AUTO: 0.91 X10(3) UL (ref 0.1–1)
MONOCYTES NFR BLD AUTO: 8.9 %
NEUTROPHILS # BLD AUTO: 7.86 X10 (3) UL (ref 1.5–7.7)
NEUTROPHILS # BLD AUTO: 7.86 X10(3) UL (ref 1.5–7.7)
NEUTROPHILS NFR BLD AUTO: 76.5 %
PHOSPHATE SERPL-MCNC: 2.7 MG/DL (ref 2.4–5.1)
PLATELET # BLD AUTO: 198 10(3)UL (ref 150–450)
POTASSIUM SERPL-SCNC: 4 MMOL/L (ref 3.5–5.1)
RBC # BLD AUTO: 3.9 X10(6)UL
SODIUM SERPL-SCNC: 142 MMOL/L (ref 136–145)
WBC # BLD AUTO: 10.3 X10(3) UL (ref 4–11)

## 2024-05-31 PROCEDURE — 83735 ASSAY OF MAGNESIUM: CPT | Performed by: COLON & RECTAL SURGERY

## 2024-05-31 PROCEDURE — 84100 ASSAY OF PHOSPHORUS: CPT | Performed by: COLON & RECTAL SURGERY

## 2024-05-31 PROCEDURE — 97161 PT EVAL LOW COMPLEX 20 MIN: CPT

## 2024-05-31 PROCEDURE — 97530 THERAPEUTIC ACTIVITIES: CPT

## 2024-05-31 PROCEDURE — 85025 COMPLETE CBC W/AUTO DIFF WBC: CPT | Performed by: COLON & RECTAL SURGERY

## 2024-05-31 PROCEDURE — 80048 BASIC METABOLIC PNL TOTAL CA: CPT | Performed by: COLON & RECTAL SURGERY

## 2024-05-31 RX ORDER — IBUPROFEN 600 MG/1
600 TABLET ORAL EVERY 6 HOURS PRN
Status: DISCONTINUED | OUTPATIENT
Start: 2024-05-31 | End: 2024-06-01

## 2024-05-31 RX ORDER — TRAMADOL HYDROCHLORIDE 50 MG/1
50 TABLET ORAL EVERY 6 HOURS PRN
Status: DISCONTINUED | OUTPATIENT
Start: 2024-05-31 | End: 2024-06-01

## 2024-05-31 RX ORDER — IBUPROFEN 600 MG/1
600 TABLET ORAL EVERY 6 HOURS PRN
Status: DISCONTINUED | OUTPATIENT
Start: 2024-05-31 | End: 2024-05-31

## 2024-05-31 NOTE — DISCHARGE INSTRUCTIONS
Surgical Discharge Instructions     Diet:  Low residue/fiber restricted: avoid raw fruits and vegetables, seeds, nuts, corn.     Activity: no lifting more than 10 pounds for 6 weeks     Driving: when pain free and not using narcotic pain medication     Showering: permitted, let soapy water run over incisions and pat dry     Follow up: call office at [phone] to make appointment to be seen in about 2 weeks      Use pain meds as needed if needed

## 2024-05-31 NOTE — PROGRESS NOTES
[facility]  part of [facility]    Progress Note    Roshni Pierce Patient Status:  Inpatient    1975 MRN [id number]   Location [facility] 4W/SW/SE Attending Titi Chen MD   Hosp Day # 1 PCP Denis Martines MD        Subjective:   Roshni Pierce is a(n) 48 year old female     POD#1 s/p laparoscopic sigmoid colectomy for colon polyp  Recovery progressing well  Pain managed well with Toradol alone  Ambulating independently  Tolerating diet without nausea or heartburn  Passing flatus and BMs, earlier belching has settled              Allergies/Medications:   Allergies:   Allergies   Allergen Reactions    Penicillins RASH     No skin blisters, no organ involvement      [COMPLETED] acetaminophen (Tylenol Extra Strength) tab 1,000 mg  1,000 mg Oral Once    [COMPLETED] heparin (Porcine) 5000 UNIT/ML injection 5,000 Units  5,000 Units Subcutaneous Once    [COMPLETED] levoFLOXacin in dextrose 5% (Levaquin) 500 mg/100mL IVPB premix 500 mg  500 mg Intravenous Once    [COMPLETED] metRONIDAZOLE in sodium chloride 0.79% (Flagyl) 5 mg/mL IVPB premix 500 mg  500 mg Intravenous Once    lactated ringers infusion  2 mL/kg/hr Intravenous Continuous    heparin (Porcine) 5000 UNIT/ML injection 5,000 Units  5,000 Units Subcutaneous Q8H HECTOR    polyethylene glycol (PEG 3350) (Miralax) 17 g oral packet 17 g  17 g Oral Daily PRN    sennosides (Senokot) tab 17.2 mg  17.2 mg Oral Nightly PRN    famotidine (Pepcid) tab 20 mg  20 mg Oral BID    Or    famotidine (Pepcid) 20 mg/2mL injection 20 mg  20 mg Intravenous BID    magnesium oxide (Mag-Ox) tab 400 mg  400 mg Oral Daily    oxyCODONE immediate release tab 5 mg  5 mg Oral Q4H PRN    Or    oxyCODONE immediate release tab 10 mg  10 mg Oral Q4H PRN    HYDROmorphone (Dilaudid) 1 MG/ML injection 0.4 mg  0.4 mg Intravenous Q2H PRN    Or    HYDROmorphone (Dilaudid) 1 MG/ML injection 0.8 mg  0.8 mg Intravenous Q2H PRN    ketorolac (Toradol) 30 MG/ML  injection 30 mg  30 mg Intravenous Q6H    ondansetron (Zofran) 4 MG/2ML injection 4 mg  4 mg Intravenous Q6H PRN    prochlorperazine (Compazine) 10 MG/2ML injection 5 mg  5 mg Intravenous Q8H PRN    [COMPLETED] levoFLOXacin in dextrose 5% (Levaquin) 750 mg/150mL IVPB premix 750 mg  750 mg Intravenous Q24H    [COMPLETED] metRONIDAZOLE in sodium chloride 0.79% (Flagyl) 5 mg/mL IVPB premix 500 mg  500 mg Intravenous Q8H           Objective:   Vital Signs:  Blood pressure 105/71, pulse 82, temperature 99.5 °F (37.5 °C), temperature source Oral, resp. rate 20, height 5' 6.5\" (1.689 m), weight 120 lb (54.4 kg), last menstrual period 05/04/2024, SpO2 100%, not currently breastfeeding.     I/O last 3 completed shifts:  In: 3540 [P.O.:240; I.V.:3000; IV PIGGYBACK:300]  Out: 4220 [Urine:4200; Blood:20]    General: No acute distress. Alert and oriented x 3.  HEENT: Moist mucous membranes. EOM-I. PERRL  Neck: No lymphadenopathy.  No JVD. No carotid bruits.  Respiratory: Clear to auscultation bilaterally.  No wheezes. No rhonchi.  Cardiovascular: S1, S2.  Regular rate and rhythm.  No murmurs. Equal pulses   Abdomen: Soft, nontender, nondistended.  Positive bowel sounds. No rebound tenderness  Incision(s): C/D/I x 5    Neurologic: No focal neurological deficits.  Musculoskeletal: Full range of motion of all extremities.  No swelling noted.  Integument: No lesions. No erythema.  Psychiatric: Appropriate mood and affect.        Assessment and Plan:     Colon polyp  POD#1 s/p laparoscopic sigmoid colectomy  Excellent progress thus far  Anticipate she'll be ready for discharge tomorrow if she maintains this level of recovery  Will stop Toradol, transition to homegoing analgesic regimen (make available Motrin/Ultram in addition to oxy)            Results:     Lab Results   Component Value Date    WBC 10.3 05/31/2024    HGB 11.8 (L) 05/31/2024    HCT 35.2 05/31/2024    .0 05/31/2024    CREATSERUM 0.80 05/31/2024    BUN <5 (L)  05/31/2024     05/31/2024    K 4.0 05/31/2024     05/31/2024    CO2 26.0 05/31/2024     (H) 05/31/2024    CA 8.9 05/31/2024    ALB 4.5 08/20/2021    ALKPHO 47 08/20/2021    BILT 0.68 08/20/2021    TP 7.2 08/20/2021    AST 17 08/20/2021    ALT 13 08/20/2021    TSH 2.690 08/20/2021    CRP <0.05 01/08/2020    MG 1.5 (L) 05/31/2024    PHOS 2.7 05/31/2024       No results found.                 Titi Chen MD  5/31/2024

## 2024-05-31 NOTE — PLAN OF CARE
Pt is alert and oriented x4. On room air. Vital signs stable. Ambulating independently. On low fiber soft diet. Tolerating well. Denies nausea. States pain is mild and controlled with scheduled toradol. Ambulated around the unit. Safety measures in place. Will continue to monitor.     Problem: Patient Centered Care  Goal: Patient preferences are identified and integrated in the patient's plan of care  Description: Interventions:  - Provide timely, complete, and accurate information to patient/family  - Incorporate patient and family knowledge, values, beliefs, and cultural backgrounds into the planning and delivery of care  - Encourage patient/family to participate in care and decision-making at the level they choose  - Honor patient and family perspectives and choices  Outcome: Progressing     Problem: PAIN - ADULT  Goal: Verbalizes/displays adequate comfort level or patient's stated pain goal  Description: INTERVENTIONS:  - Encourage pt to monitor pain and request assistance  - Assess pain using appropriate pain scale  - Administer analgesics based on type and severity of pain and evaluate response  - Implement non-pharmacological measures as appropriate and evaluate response  - Consider cultural and social influences on pain and pain management  - Manage/alleviate anxiety  - Utilize distraction and/or relaxation techniques  - Monitor for opioid side effects  - Notify MD/LIP if interventions unsuccessful or patient reports new pain  - Anticipate increased pain with activity and pre-medicate as appropriate  Outcome: Progressing     Problem: GASTROINTESTINAL - ADULT  Goal: Minimal or absence of nausea and vomiting  Description: INTERVENTIONS:  - Maintain adequate hydration with IV or PO as ordered and tolerated  - Nasogastric tube to low intermittent suction as ordered  - Evaluate effectiveness of ordered antiemetic medications  - Provide nonpharmacologic comfort measures as appropriate  - Advance diet as tolerated,  if ordered  - Obtain nutritional consult as needed  - Evaluate fluid balance  Outcome: Progressing  Goal: Maintains or returns to baseline bowel function  Description: INTERVENTIONS:  - Assess bowel function  - Maintain adequate hydration with IV or PO as ordered and tolerated  - Evaluate effectiveness of GI medications  - Encourage mobilization and activity  - Obtain nutritional consult as needed  - Establish a toileting routine/schedule  - Consider collaborating with pharmacy to review patient's medication profile  Outcome: Progressing     Problem: SKIN/TISSUE INTEGRITY - ADULT  Goal: Skin integrity remains intact  Description: INTERVENTIONS  - Assess and document risk factors for pressure ulcer development  - Assess and document skin integrity  - Monitor for areas of redness and/or skin breakdown  - Initiate interventions, skin care algorithm/standards of care as needed  Outcome: Progressing  Goal: Incision(s), wounds(s) or drain site(s) healing without S/S of infection  Description: INTERVENTIONS:  - Assess and document risk factors for pressure ulcer development  - Assess and document skin integrity  - Assess and document dressing/incision, wound bed, drain sites and surrounding tissue  - Implement wound care per orders  - Initiate isolation precautions as appropriate  - Initiate Pressure Ulcer prevention bundle as indicated  Outcome: Progressing

## 2024-05-31 NOTE — OCCUPATIONAL THERAPY NOTE
Order received and chart reviewed. Attempted to see pt for OT today. Pt is has been ambulating in the halls with mod I and performing ADLs in the room with mod I-I. Pt reported feeling close to functional baseline. Educated on pain management/abdominal protective strategies during ADLs and recommended a reacher as needed. Educated on log roll techs for bed mobility and technique for car transfers and all questions answered. OT will sign off at this time as pt does not require skilled OT services. Please re-order if functional status declines.    Liseth Acevedo, OTR/L

## 2024-05-31 NOTE — PROGRESS NOTES
[facility]  part of Located within [facility]     DM Hospitalist Progress Note     PCP: Denis Martines MD    CC: Follow up       Assessment/Plan:     Roshni Pierce is a 48 year old female with PMH sig for IBS, family history of CAD, and recent diagnosis of sigmoid cancer who presented for laparoscopic sigmoid colectomy and anastomosis.       Sigmoid polyp/ malignancy with path showing high grade dysplasia  -await final path from sigmoid colectomy with primary anastomosis  -monitor expected Acute blood loss anemia,preop hgb 13.3->11.8  -diet per surgery  -prn IV and po pain meds for post op pain control     FEN: IVF per protocol, lytes in AM, diet per surgery     PPX; SCd, HSQ     Dispo full code, pending course     Questions/concerns were discussed with patient and/or family by bedside.    Note: This chart was prepared using voice recognition software and may contain unintended word substitution errors.       Thank You,  Kassie Sidhu M.D.  [facility]  Answering Service: [phone]        Subjective     Overall doing well.  Tolerating diet, passing gas, pain controlled.  No CP, SOB, or N/V.      Objective     OBJECTIVE:  Temp:  [97.3 °F (36.3 °C)-99.5 °F (37.5 °C)] 99.5 °F (37.5 °C)  Pulse:  [77-98] 82  Resp:  [10-20] 20  BP: ()/(55-80) 105/71  SpO2:  [97 %-100 %] 100 %    Intake/Output:    Intake/Output Summary (Last 24 hours) at 5/31/2024 1257  Last data filed at 5/31/2024 0500  Gross per 24 hour   Intake 340 ml   Output 4220 ml   Net -3880 ml       Last 3 Weights   05/30/24 1432 120 lb (54.4 kg)   05/30/24 0600 120 lb (54.4 kg)   05/22/24 1630 123 lb (55.8 kg)   08/19/21 1140 130 lb 9.6 oz (59.2 kg)   06/22/20 1039 124 lb (56.2 kg)       Exam  Gen: No acute distress, alert and oriented x3  Neck Supple, no JVD  Pulm: Lungs clear, normal respiratory effort, No wheezing or crackles  CV: Heart with regular rate and rhythm, No murmurs, rubs, gallops  Abd: Abdomen soft, tender as  expected, hypoactive BS   MSK:   no clubbing, no cyanosis.  No Lower extremity edema  Skin: no rashes or lesions, well perfused  Psych: mood stable, cooperative  Neuro: No focal deficits    Medications      heparin  5,000 Units Subcutaneous Q8H HECTOR    famotidine  20 mg Oral BID    Or    famotidine  20 mg Intravenous BID    magnesium oxide  400 mg Oral Daily    ketorolac  30 mg Intravenous Q6H      lactated ringers 2 mL/kg/hr (05/30/24 1814)       polyethylene glycol (PEG 3350)    sennosides    oxyCODONE **OR** oxyCODONE    HYDROmorphone **OR** HYDROmorphone    ondansetron    prochlorperazine    Data Review:       Labs:     Recent Labs   Lab 05/31/24  0659   WBC 10.3   HGB 11.8*   MCV 90.3   .0       Recent Labs   Lab 05/31/24  0659      K 4.0      CO2 26.0   BUN <5*   CREATSERUM 0.80   CA 8.9   MG 1.5*   PHOS 2.7   *       No results for input(s): \"ALT\", \"AST\", \"ALB\", \"AMYLASE\", \"LIPASE\", \"LDH\" in the last 168 hours.    Invalid input(s): \"ALPHOS\", \"TBIL\", \"DBIL\", \"TPROT\"    Recent Labs   Lab 05/30/24  1441   PGLU 167*       No results for input(s): \"TROP\" in the last 168 hours.    Imaging:  No results found.

## 2024-05-31 NOTE — PLAN OF CARE
Patient A/Ox4. Room air. Clear liquid diet. ERAS protocol. PRN Zofran for nausea. Scheduled Tramadol for pain control. Gardner in place. IV Flagyl infusing. Call light with a reach.   Problem: Patient Centered Care  Goal: Patient preferences are identified and integrated in the patient's plan of care  Description: Interventions:  - Provide timely, complete, and accurate information to patient/family  - Incorporate patient and family knowledge, values, beliefs, and cultural backgrounds into the planning and delivery of care  - Encourage patient/family to participate in care and decision-making at the level they choose  - Honor patient and family perspectives and choices  Outcome: Progressing

## 2024-05-31 NOTE — PLAN OF CARE
No acute changes over night. Pt is alert and oriented on RA. Gardner was removed per protocol. Pt is tolerating a CLD. Pt denies nausea or vomiting. IVF and abx running as ordered. Pain managed with IV Toradol. Pt ambulated around the halls once. Pt is up with standby assist. Call light is within reach and safety measures are in place.    Problem: PAIN - ADULT  Goal: Verbalizes/displays adequate comfort level or patient's stated pain goal  Description: INTERVENTIONS:  - Encourage pt to monitor pain and request assistance  - Assess pain using appropriate pain scale  - Administer analgesics based on type and severity of pain and evaluate response  - Implement non-pharmacological measures as appropriate and evaluate response  - Consider cultural and social influences on pain and pain management  - Manage/alleviate anxiety  - Utilize distraction and/or relaxation techniques  - Monitor for opioid side effects  - Notify MD/LIP if interventions unsuccessful or patient reports new pain  - Anticipate increased pain with activity and pre-medicate as appropriate  Outcome: Progressing     Problem: GASTROINTESTINAL - ADULT  Goal: Minimal or absence of nausea and vomiting  Description: INTERVENTIONS:  - Maintain adequate hydration with IV or PO as ordered and tolerated  - Nasogastric tube to low intermittent suction as ordered  - Evaluate effectiveness of ordered antiemetic medications  - Provide nonpharmacologic comfort measures as appropriate  - Advance diet as tolerated, if ordered  - Obtain nutritional consult as needed  - Evaluate fluid balance  Outcome: Progressing  Goal: Maintains or returns to baseline bowel function  Description: INTERVENTIONS:  - Assess bowel function  - Maintain adequate hydration with IV or PO as ordered and tolerated  - Evaluate effectiveness of GI medications  - Encourage mobilization and activity  - Obtain nutritional consult as needed  - Establish a toileting routine/schedule  - Consider  collaborating with pharmacy to review patient's medication profile  Outcome: Progressing     Problem: SKIN/TISSUE INTEGRITY - ADULT  Goal: Skin integrity remains intact  Description: INTERVENTIONS  - Assess and document risk factors for pressure ulcer development  - Assess and document skin integrity  - Monitor for areas of redness and/or skin breakdown  - Initiate interventions, skin care algorithm/standards of care as needed  Outcome: Progressing  Goal: Incision(s), wounds(s) or drain site(s) healing without S/S of infection  Description: INTERVENTIONS:  - Assess and document risk factors for pressure ulcer development  - Assess and document skin integrity  - Assess and document dressing/incision, wound bed, drain sites and surrounding tissue  - Implement wound care per orders  - Initiate isolation precautions as appropriate  - Initiate Pressure Ulcer prevention bundle as indicated  Outcome: Progressing

## 2024-05-31 NOTE — PHYSICAL THERAPY NOTE
PHYSICAL THERAPY EVALUATION - INPATIENT     Room Number: 444/444-A  Evaluation Date: 5/31/2024  Type of Evaluation: Initial   Physician Order: PT Eval and Treat    Presenting Problem: colon polyp s/plap resection 5/30     Reason for Therapy: Mobility Dysfunction and Discharge Planning    PHYSICAL THERAPY ASSESSMENT   Patient is a 48 year old female admitted 5/30/2024 for removal of colon polyp, lap colon resection.  Prior to admission, patient's baseline is completely independent, driving, working as a teacher. She lives in a home with her family.  Patient is currently functioning near baseline with bed mobility, gait, stair negotiation, standing prolonged periods, and performing household tasks.  Patient is requiring contact guard assist as a result of the following impairments: decreased endurance/aerobic capacity, pain, and medical status.  Physical Therapy will continue to follow for duration of hospitalization.    Patient will benefit from continued skilled PT Services For duration of hospitalization, however, given the patient is functioning near baseline level do not anticipate skilled therapy needs at discharge .    PLAN  PT Treatment Plan: Bed mobility;Body mechanics;Patient education;Gait training;Stair training  Rehab Potential : Good  Frequency (Obs): 3x/week    PHYSICAL THERAPY MEDICAL/SOCIAL HISTORY   History related to current admission: Roshni Pierce is a 48 year old female who presents for surgery for colon polyp.    INTERVAL HISTORY   She has completed CT C/A/P and is here today for rigid proctoscopy to localize lesion.  She has not developed any new symptoms since last visit.      Problem List  Active Problems:    Colon polyp      HOME SITUATION  Home Situation  Type of Home: House  Home Layout: Two level;Bed/bath upstairs  Stairs to Enter : 3  Railing: No  Stairs to Bedroom: 12  Railing: Yes  Lives With: Spouse;Family (\"someone is always around\")  Drives: Yes  Patient Owned Equipment:  None  Patient Regularly Uses: Glasses     Prior Level of [place]: Patient reports she is normally independent and managing the home. She works and is active, likes to exercise. She reports extended family can help if needed as well    SUBJECTIVE  \"I know I have to move to get better\"    PHYSICAL THERAPY EXAMINATION   OBJECTIVE  Precautions: Abdominal protective strategies  Fall Risk: Standard fall risk    WEIGHT BEARING RESTRICTION  Weight Bearing Restriction: None                PAIN ASSESSMENT  Ratin  Location: discomfort at incisional site  Management Techniques: Activity promotion    COGNITION  Overall Cognitive Status:  WFL - within functional limits    RANGE OF MOTION AND STRENGTH ASSESSMENT  Upper extremity ROM and strength are within functional limits   Lower extremity ROM is within functional limits   Lower extremity strength is within functional limits     BALANCE  Static Sitting: Normal  Dynamic Sitting: Good  Static Standing: Fair  Dynamic Standing: Poor +    AM-PAC '6-Clicks' INPATIENT SHORT FORM - BASIC MOBILITY  How much difficulty does the patient currently have...  Patient Difficulty: Turning over in bed (including adjusting bedclothes, sheets and blankets)?: A Little   Patient Difficulty: Sitting down on and standing up from a chair with arms (e.g., wheelchair, bedside commode, etc.): A Little   Patient Difficulty: Moving from lying on back to sitting on the side of the bed?: A Little   How much help from another person does the patient currently need...   Help from Another: Moving to and from a bed to a chair (including a wheelchair)?: A Little   Help from Another: Need to walk in hospital room?: A Little   Help from Another: Climbing 3-5 steps with a railing?: A Little     AM-PAC Score:  Raw Score: 18   Approx Degree of Impairment: 46.58%   Standardized Score (AM-PAC Scale): 43.63   CMS Modifier (G-Code): CK    FUNCTIONAL ABILITY STATUS  Functional Mobility/Gait Assessment  Gait  Assistance: Supervision;Modified independent  Distance (ft): 450  Assistive Device: Rolling walker  Pattern: Shuffle (forward flexed, mild shuffling steps with increased distance due to pain.Cues for upright)  Stairs: Other (comment) (Discussed car transfers for abdominal sparing as well as stair and stoop navigation, pt feels she will be ready for tomorrow to climb stairs)  Rolling: supervision  Supine to Sit: supervision  Sit to Supine: supervision  Sit to Stand: modified independent    Exercise/Education Provided:  Bed mobility  Energy conservation  Functional activity tolerated  Gait training  Posture  Transfer training  Discussion about stair navigation and car transfers, education for abdominal sparing techniques during mobility    RN approves participation. Patient presents in bed and reports she is feeling pretty good. She is open to education and training in abdominal sparing, activity progression in the next weeks. Training and discussion in log roll techniques, upright posture and use of RW prn for upright and increasing distance as tolerated. Training in body mechanics and car transfers as well. She feels she will be able to progress to fully independent level by discharge and has good support at home. Will keep on caseload for now and in case she wants to try stairs prior to dc although does not have concerns at this time. She has a good understanding of needing to be up to chair for meals and increasing times each day, walking multiple times a day (halls x 4) and use of RW. She will let RN know if she wants a walker at dc. Education to stand upright and inside walker frame provided as well as pt was hunched forward and this worsened with increasing distance.    The patient's Approx Degree of Impairment: 46.58% has been calculated based on documentation in the [facility] '6 clicks' Inpatient Basic Mobility Short Form.  Research supports that patients with this level of impairment may benefit from home with  24 hr care and HHPT. However, anticipate patient will progress to mod I level and not need home health.  Final disposition will be made by interdisciplinary medical team.    Patient End of Session: In bed;Needs met;Call light within reach;RN aware of session/findings;All patient questions and concerns addressed    CURRENT GOALS  Goals to be met by: 6/5/24  Patient Goal Patient's self-stated goal is: to get home   Goal #1 Patient is able to demonstrate supine - sit EOB @ level: modified independent     Goal #1   Current Status    Goal #2 Patient is able to demonstrate transfers EOB to/from [facility] at assistance level: independent with none     Goal #2  Current Status    Goal #3 Patient is able to ambulate 450 feet with assist device: none at assistance level: independent   Goal #3   Current Status    Goal #4 Patient will negotiate 8 stairs/one curb w/ assistive device and supervision   Goal #4   Current Status    Goal #5 Patient to demonstrate independence with home activity/exercise instructions provided to patient in preparation for discharge.   Goal #5   Current Status    Goal #6    Goal #6  Current Status      Patient Evaluation Complexity Level:  History Low - no personal factors and/or co-morbidities   Examination of body systems Moderate - addressing a total of 3 or more elements   Clinical Presentation Low- Stable   Clinical Decision Making  Low Complexity     Therapeutic Activity:  13 minutes

## 2024-06-01 VITALS
BODY MASS INDEX: 19.06 KG/M2 | HEIGHT: 66.5 IN | WEIGHT: 120 LBS | HEART RATE: 93 BPM | OXYGEN SATURATION: 100 % | SYSTOLIC BLOOD PRESSURE: 121 MMHG | TEMPERATURE: 98 F | RESPIRATION RATE: 18 BRPM | DIASTOLIC BLOOD PRESSURE: 86 MMHG

## 2024-06-01 RX ORDER — OXYCODONE HYDROCHLORIDE 5 MG/1
5 TABLET ORAL EVERY 4 HOURS PRN
Qty: 10 TABLET | Refills: 0 | Status: SHIPPED | OUTPATIENT
Start: 2024-06-01

## 2024-06-01 RX ORDER — POLYETHYLENE GLYCOL 3350 17 G/17G
17 POWDER, FOR SOLUTION ORAL DAILY PRN
Qty: 30 PACKET | Refills: 0 | Status: SHIPPED | OUTPATIENT
Start: 2024-06-01

## 2024-06-01 RX ORDER — TRAMADOL HYDROCHLORIDE 50 MG/1
50 TABLET ORAL EVERY 6 HOURS PRN
Qty: 10 TABLET | Refills: 0 | Status: SHIPPED | OUTPATIENT
Start: 2024-06-01

## 2024-06-01 RX ORDER — IBUPROFEN 600 MG/1
600 TABLET ORAL EVERY 6 HOURS PRN
Status: SHIPPED | COMMUNITY
Start: 2024-06-01

## 2024-06-01 NOTE — PLAN OF CARE
Pt is alert and oriented x4. On room air. Vital signs stable. Ambulating independently. On general diet. Tolerating well. Denies nausea. Complains of pain. Motrin given. Cleared for discharge. IV removed with no signs and symptoms of infection. AVS Explained and pt states understanding. Pt wheeled downstairs accompanied by her .     Problem: Patient Centered Care  Goal: Patient preferences are identified and integrated in the patient's plan of care  Description: Interventions:  - Provide timely, complete, and accurate information to patient/family  - Incorporate patient and family knowledge, values, beliefs, and cultural backgrounds into the planning and delivery of care  - Encourage patient/family to participate in care and decision-making at the level they choose  - Honor patient and family perspectives and choices  Outcome: Adequate for Discharge     Problem: PAIN - ADULT  Goal: Verbalizes/displays adequate comfort level or patient's stated pain goal  Description: INTERVENTIONS:  - Encourage pt to monitor pain and request assistance  - Assess pain using appropriate pain scale  - Administer analgesics based on type and severity of pain and evaluate response  - Implement non-pharmacological measures as appropriate and evaluate response  - Consider cultural and social influences on pain and pain management  - Manage/alleviate anxiety  - Utilize distraction and/or relaxation techniques  - Monitor for opioid side effects  - Notify MD/LIP if interventions unsuccessful or patient reports new pain  - Anticipate increased pain with activity and pre-medicate as appropriate  Outcome: Adequate for Discharge     Problem: GASTROINTESTINAL - ADULT  Goal: Minimal or absence of nausea and vomiting  Description: INTERVENTIONS:  - Maintain adequate hydration with IV or PO as ordered and tolerated  - Nasogastric tube to low intermittent suction as ordered  - Evaluate effectiveness of ordered antiemetic medications  - Provide  nonpharmacologic comfort measures as appropriate  - Advance diet as tolerated, if ordered  - Obtain nutritional consult as needed  - Evaluate fluid balance  Outcome: Adequate for Discharge  Goal: Maintains or returns to baseline bowel function  Description: INTERVENTIONS:  - Assess bowel function  - Maintain adequate hydration with IV or PO as ordered and tolerated  - Evaluate effectiveness of GI medications  - Encourage mobilization and activity  - Obtain nutritional consult as needed  - Establish a toileting routine/schedule  - Consider collaborating with pharmacy to review patient's medication profile  Outcome: Adequate for Discharge     Problem: SKIN/TISSUE INTEGRITY - ADULT  Goal: Skin integrity remains intact  Description: INTERVENTIONS  - Assess and document risk factors for pressure ulcer development  - Assess and document skin integrity  - Monitor for areas of redness and/or skin breakdown  - Initiate interventions, skin care algorithm/standards of care as needed  Outcome: Adequate for Discharge  Goal: Incision(s), wounds(s) or drain site(s) healing without S/S of infection  Description: INTERVENTIONS:  - Assess and document risk factors for pressure ulcer development  - Assess and document skin integrity  - Assess and document dressing/incision, wound bed, drain sites and surrounding tissue  - Implement wound care per orders  - Initiate isolation precautions as appropriate  - Initiate Pressure Ulcer prevention bundle as indicated  Outcome: Adequate for Discharge

## 2024-06-01 NOTE — PLAN OF CARE
Patient is alert and orientated.  Patient is tolerating soft diet and is on ERAS protocol.  Patient ambulating in halls and to restroom.  Patient had a BM today.  Took Motrin 600 for pain. Patient has personal belongings and call light within reach.  Safety measures in place. Patient expected to discharge later today.  Problem: Patient Centered Care  Goal: Patient preferences are identified and integrated in the patient's plan of care  Description: Interventions:  - What would you like us to know as we care for you? I live at home with my  and children.    - Provide timely, complete, and accurate information to patient/family  - Incorporate patient and family knowledge, values, beliefs, and cultural backgrounds into the planning and delivery of care  - Encourage patient/family to participate in care and decision-making at the level they choose  - Honor patient and family perspectives and choices  Outcome: Progressing     Problem: Patient/Family Goals  Goal: Patient/Family Long Term Goal  Description: Patient's Long Term Goal: To recover so I may enjoy my time off this summer    Interventions:  - Continue to follow plan of care  - See additional Care Plan goals for specific interventions  Outcome: Progressing  Goal: Patient/Family Short Term Goal  Description: Patient's Short Term Goal: To go home and be with my family    Interventions:   - Continue to follow plan of care  - See additional Care Plan goals for specific interventions  Outcome: Progressing     Problem: PAIN - ADULT  Goal: Verbalizes/displays adequate comfort level or patient's stated pain goal  Description: INTERVENTIONS:  - Encourage pt to monitor pain and request assistance  - Assess pain using appropriate pain scale  - Administer analgesics based on type and severity of pain and evaluate response  - Implement non-pharmacological measures as appropriate and evaluate response  - Consider cultural and social influences on pain and pain  management  - Manage/alleviate anxiety  - Utilize distraction and/or relaxation techniques  - Monitor for opioid side effects  - Notify MD/LIP if interventions unsuccessful or patient reports new pain  - Anticipate increased pain with activity and pre-medicate as appropriate  Outcome: Progressing     Problem: GASTROINTESTINAL - ADULT  Goal: Minimal or absence of nausea and vomiting  Description: INTERVENTIONS:  - Maintain adequate hydration with IV or PO as ordered and tolerated  - Nasogastric tube to low intermittent suction as ordered  - Evaluate effectiveness of ordered antiemetic medications  - Provide nonpharmacologic comfort measures as appropriate  - Advance diet as tolerated, if ordered  - Obtain nutritional consult as needed  - Evaluate fluid balance  Outcome: Progressing  Goal: Maintains or returns to baseline bowel function  Description: INTERVENTIONS:  - Assess bowel function  - Maintain adequate hydration with IV or PO as ordered and tolerated  - Evaluate effectiveness of GI medications  - Encourage mobilization and activity  - Obtain nutritional consult as needed  - Establish a toileting routine/schedule  - Consider collaborating with pharmacy to review patient's medication profile  Outcome: Progressing     Problem: SKIN/TISSUE INTEGRITY - ADULT  Goal: Skin integrity remains intact  Description: INTERVENTIONS  - Assess and document risk factors for pressure ulcer development  - Assess and document skin integrity  - Monitor for areas of redness and/or skin breakdown  - Initiate interventions, skin care algorithm/standards of care as needed  Outcome: Progressing  Goal: Incision(s), wounds(s) or drain site(s) healing without S/S of infection  Description: INTERVENTIONS:  - Assess and document risk factors for pressure ulcer development  - Assess and document skin integrity  - Assess and document dressing/incision, wound bed, drain sites and surrounding tissue  - Implement wound care per orders  - Initiate  isolation precautions as appropriate  - Initiate Pressure Ulcer prevention bundle as indicated  Outcome: Progressing

## 2024-06-01 NOTE — DISCHARGE SUMMARY
General Medicine Discharge Summary     Patient ID:  Roshni Pierce  48 year old  7/31/1975    Admit date: 5/30/2024    Discharge date and time: 6/1/24    Attending Physician: Titi Chen MD     Consults: IP CONSULT TO RESPIRATORY CARE  IP CONSULT TO HOSPITALIST    Primary Care Physician: Denis Martines MD     Reason for admission: Observation after laparoscopic sigmoid colectomy for colon polyp    Risk For Readmission: Low    Discharge Diagnoses: Sigmoid cancer, sigmoid polyp  Colon polyp  See Additional Discharge Diagnoses in Hospital Course    Discharged Condition: good    Follow-up with labs/images appointments:   Close follow-up with primary care provider recommended within 2 weeks follow-up with surgery recommended within 2 weeks    Exam  Gen: No acute distress  Pulm: Lungs clear, normal respiratory effort  CV: Heart with regular rate and rhythm  Abd: Abdomen soft, positive bowel sounds nontender at surgical site  EXT: no edema     HPI:   Per Dr. Sidhu    Roshni Pierce is a 48 year old female with PMH sig for IBS, family history of CAD, and recent diagnosis of sigmoid cancer who presented for laparoscopic sigmoid colectomy and anastomosis.       She had routine screening colonoscopy in April 2024 and had large rectosigmoid polyp that was not amenable to removal and pathology showing superficial high-grade dysplasia.  Presented today for removal.  Postop patient without nausea or vomiting.  No chest pain or shortness of breath.  Pain currently controlled with IV pain meds.    Hospital Course:   Patient was admitted for observation after laparoscopic resection of sigmoid polyp for colon cancer did well after procedure pain was well-controlled advance diet and tolerated it well had a bowel movement on the day of discharge close follow-up with primary care provider and general surgery was recommended    Operative Procedures: Procedure(s) (LRB):  Proctoscopy, laparoscopic sigmoid resection (N/A)      Imaging: No results found.    Disposition: home    Activity: activity as tolerated  Diet: regular diet  Wound Care: keep wound clean and dry  Code Status: Full Code  O2: none    Home Medication Changes: See list below    Med list     Medication List        START taking these medications      ibuprofen 600 MG Tabs  Commonly known as: Motrin     oxyCODONE 5 MG Tabs  Take 1 tablet (5 mg total) by mouth every 4 (four) hours as needed.     Polyethylene Glycol 3350 17 g Pack  Commonly known as: MIRALAX  Take 17 g by mouth daily as needed (if no bowel movement in the last 24 hours).     traMADol 50 MG Tabs  Commonly known as: Ultram  Take 1 tablet (50 mg total) by mouth every 6 (six) hours as needed.            CONTINUE taking these medications      cholecalciferol 25 MCG (1000 UT) Tabs  Commonly known as: Vitamin D3     ketoconazole 2 % Crea  Commonly known as: Nizoral  Apply twice per day     PROBIOTIC-10 OR               Where to Get Your Medications        These medications were sent to [facility]/pharmacy #[id number] - [place], [place] - [address] AT [facility], [phone], [phone]  [address] [address]      Hours: 24-hours Phone: [phone]   oxyCODONE 5 MG Tabs  Polyethylene Glycol 3350 17 g Pack  traMADol 50 MG Tabs         FU   Follow-up Information       Denis Martines MD Follow up in 2 week(s).    Specialty: Internal Medicine  Contact information:  [address] [facility] [address] [phone]               Titi Chen MD Follow up in 2 week(s).    Specialty: SURGERY, GENERAL  Contact information:  [address] [place] [address]  [address]  [facility] [address] [phone]                             DC instructions:      Other Discharge Instructions:         Surgical Discharge Instructions     Diet:  Low residue/fiber restricted: avoid raw fruits and vegetables, seeds, nuts, corn.     Activity: no lifting more than 10 pounds for 6 weeks     Driving: when pain free and not using narcotic pain  medication     Showering: permitted, let soapy water run over incisions and pat dry     Follow up: call office at [phone] to make appointment to be seen in about 2 weeks      Use pain meds as needed if needed         I reconciled current and discharge medications on day of discharge, discussed changes with patient and noted changes above.       Total Time Coordinating Care: 35 minutes    Patient had opportunity to ask questions and state understand and agree with therapeutic plan as outlined    Thank You,    Jerson Arellano, DO   Hospitalist with [facility]

## 2024-06-01 NOTE — PROGRESS NOTES
[facility]  part of [facility]    Progress Note    Roshni Pierce Patient Status:  Inpatient    1975 MRN [id number]   Location [facility] 4W/SW/SE Attending Titi Chen MD   Hosp Day # 2 PCP Denis Martines MD     Subjective:   Doing well.  Pain well-controlled, motrin only.  Ambulating.  Several small BMs.  Small amt of dark blood in BMs.  Voiding ok.  Latanya soft diet.   at bedside.    Objective:   Vital Signs:  Blood pressure 121/86, pulse 93, temperature 98 °F (36.7 °C), temperature source Oral, resp. rate 18, height 5' 6.5\" (1.689 m), weight 120 lb (54.4 kg), last menstrual period 2024, SpO2 100%, not currently breastfeeding.     General:  No acute distress. Alert and oriented x 3.  HEENT:  Moist mucous membranes.  Neck:  Soft neck.    Respiratory: Good air entry bilaterally, symmetrical expansion.  Cardiovascular:  Regular rate and rhythm.  No JVD.  Abdomen:  Soft, nontender, nondistended.  Abd flat.  Incs CDI w glue.  No redness  Neurologic:  No focal neurological deficits.  Musculoskeletal:  Full range of motion of all extremities.  No swelling noted.        Intake/Output Summary (Last 24 hours) at 2024 1344  Last data filed at 2024 0500  Gross per 24 hour   Intake --   Output 200 ml   Net -200 ml        Results:   Lab Results   Component Value Date    WBC 10.3 2024    HGB 11.8 (L) 2024    HCT 35.2 2024    .0 2024    CREATSERUM 0.80 2024    BUN <5 (L) 2024     2024    K 4.0 2024     2024    CO2 26.0 2024     (H) 2024    CA 8.9 2024    ALB 4.5 2021    ALKPHO 47 2021    BILT 0.68 2021    TP 7.2 2021    AST 17 2021    ALT 13 2021    TSH 2.690 2021    CRP <0.05 2020    MG 1.5 (L) 2024    PHOS 2.7 2024       Pathology pending    Assessment & Plan:   S/P lap sigmoid resection--POD #2  Doing well.  Disc  the blood in stool, postop.  Has met discharge criteria.  Instructions.  F/U w Dr. Chen at scheduled appt.  Check path.    Js Desai MD  6/1/2024

## 2024-06-03 NOTE — PAYOR COMM NOTE
--------------  DISCHARGE REVIEW    Payor: [facility]  Subscriber #:  [id number]  Authorization Number: [id number]    Admit date: 5/30/24  Admit time:   5:50 AM  Discharge Date: 6/1/2024  3:15 PM     Admitting Physician: Titi Chen MD  Attending Physician:  No att. providers found  Primary Care Physician: Denis Martines MD          Discharge Summary Notes        Discharge Summary signed by Jerson Arellano DO at 6/1/2024 12:22 PM       Author: Jerson Arellano DO Specialty: HOSPITALIST Author Type: Physician    Filed: 6/1/2024 12:22 PM Date of Service: 6/1/2024 12:20 PM Status: Signed    : Jerson Arellano DO (Physician)           General Medicine Discharge Summary     Patient ID:  Roshni Pierce  48 year old  7/31/1975    Admit date: 5/30/2024    Discharge date and time: 6/1/24    Attending Physician: Titi Chen MD     Consults: IP CONSULT TO RESPIRATORY CARE  IP CONSULT TO HOSPITALIST    Primary Care Physician: Denis Martines MD     Reason for admission: Observation after laparoscopic sigmoid colectomy for colon polyp    Risk For Readmission: Low    Discharge Diagnoses: Sigmoid cancer, sigmoid polyp  Colon polyp  See Additional Discharge Diagnoses in Hospital Course    Discharged Condition: good    Follow-up with labs/images appointments:   Close follow-up with primary care provider recommended within 2 weeks follow-up with surgery recommended within 2 weeks    Exam  Gen: No acute distress  Pulm: Lungs clear, normal respiratory effort  CV: Heart with regular rate and rhythm  Abd: Abdomen soft, positive bowel sounds nontender at surgical site  EXT: no edema     HPI:   Per Dr. Sidhu    Roshni Pierce is a 48 year old female with PMH sig for IBS, family history of CAD, and recent diagnosis of sigmoid cancer who presented for laparoscopic sigmoid colectomy and anastomosis.       She had routine screening colonoscopy in April 2024 and had large rectosigmoid polyp that was not amenable to removal and  pathology showing superficial high-grade dysplasia.  Presented today for removal.  Postop patient without nausea or vomiting.  No chest pain or shortness of breath.  Pain currently controlled with IV pain meds.    Hospital Course:   Patient was admitted for observation after laparoscopic resection of sigmoid polyp for colon cancer did well after procedure pain was well-controlled advance diet and tolerated it well had a bowel movement on the day of discharge close follow-up with primary care provider and general surgery was recommended    Operative Procedures: Procedure(s) (LRB):  Proctoscopy, laparoscopic sigmoid resection (N/A)     Imaging: No results found.    Disposition: home    Activity: activity as tolerated  Diet: regular diet  Wound Care: keep wound clean and dry  Code Status: Full Code  O2: none    Home Medication Changes: See list below    Med list     Medication List        START taking these medications      ibuprofen 600 MG Tabs  Commonly known as: Motrin     oxyCODONE 5 MG Tabs  Take 1 tablet (5 mg total) by mouth every 4 (four) hours as needed.     Polyethylene Glycol 3350 17 g Pack  Commonly known as: MIRALAX  Take 17 g by mouth daily as needed (if no bowel movement in the last 24 hours).     traMADol 50 MG Tabs  Commonly known as: Ultram  Take 1 tablet (50 mg total) by mouth every 6 (six) hours as needed.            CONTINUE taking these medications      cholecalciferol 25 MCG (1000 UT) Tabs  Commonly known as: Vitamin D3     ketoconazole 2 % Crea  Commonly known as: Nizoral  Apply twice per day     PROBIOTIC-10 OR               Where to Get Your Medications        These medications were sent to [facility]/pharmacy #[id number] - [place], [place] - [address] AT [facility], [phone], [phone]  [address], [facility] [address]      Hours: 24-hours Phone: [phone]   oxyCODONE 5 MG Tabs  Polyethylene Glycol 3350 17 g Pack  traMADol 50 MG Tabs         FU   Follow-up Information       Ip,  MD Denis Follow up in 2 week(s).    Specialty: Internal Medicine  Contact information:  [address] [facility] [id number] [phone]               Titi Chen MD Follow up in 2 week(s).    Specialty: SURGERY, GENERAL  Contact information:  [address]  [address]  [facility] [facility] [address]  [phone]                             DC instructions:      Other Discharge Instructions:         Surgical Discharge Instructions     Diet:  Low residue/fiber restricted: avoid raw fruits and vegetables, seeds, nuts, corn.     Activity: no lifting more than 10 pounds for 6 weeks     Driving: when pain free and not using narcotic pain medication     Showering: permitted, let soapy water run over incisions and pat dry     Follow up: call office at [phone] to make appointment to be seen in about 2 weeks      Use pain meds as needed if needed         I reconciled current and discharge medications on day of discharge, discussed changes with patient and noted changes above.       Total Time Coordinating Care: 35 minutes    Patient had opportunity to ask questions and state understand and agree with therapeutic plan as outlined    Thank You,    Jerson Arellano DO   Hospitalist with [facility]         Electronically signed by Jerson Arellano DO on 6/1/2024 12:22 PM         REVIEWER COMMENTS

## (undated) DEVICE — AIRSEAL 12 MM ACCESS PORT AND PALM GRIP OBTURATOR WITH BLADELESS OPTICAL TIP, 150 MM LENGTH: Brand: AIRSEAL

## (undated) DEVICE — AIRSEAL 12 MM ACCESS PORT AND PALM GRIP OBTURATOR WITH BLADELESS OPTICAL TIP, 120 MM LENGTH: Brand: AIRSEAL

## (undated) DEVICE — VIOLET BRAIDED (POLYGLACTIN 910), SYNTHETIC ABSORBABLE SUTURE: Brand: COATED VICRYL

## (undated) DEVICE — MARYLAND JAW LAPAROSCOPIC SEALER/DIVIDER COATED: Brand: LIGASURE

## (undated) DEVICE — AIRSEAL TRI-LUMEN LILTERED TUBE SET: Brand: AIRSEAL

## (undated) DEVICE — SUT COAT VCRL+ 0 27IN UR-6 ABSRB VLT ANTIBACT

## (undated) DEVICE — SOLUTION IRRIG 3000ML 0.9% NACL FLX CONT

## (undated) DEVICE — SUT PROL 2-0 30IN SH NABSRB BLU L26MM 1/2 CIR

## (undated) DEVICE — CURVED, LARGE JAW, OPEN SEALER/DIVIDER NANO-COATED: Brand: LIGASURE IMPACT

## (undated) DEVICE — SUT PROL 0 30IN CT-1 NABSRB BLU L36MM 1/2 CIR

## (undated) DEVICE — SUT PERMA- 2-0 30IN NABSRB BLK TIE SILK

## (undated) DEVICE — BINDER ABD SM M W12IN CIRC 30-45IN 4 PNL E

## (undated) DEVICE — CAUTERY: TIP CLEANER XR 100/CS: Brand: MEDICAL ACTION INDUSTRIES

## (undated) DEVICE — ARTICULATION RELOAD WITH TRI-STAPLE TECHNOLOGY: Brand: ENDO GIA

## (undated) DEVICE — SUT PDS II 1-0 96IN ABSRB VLT L70MM XLH

## (undated) DEVICE — ADHESIVE SKIN TOP FOR WND CLSR DERMBND ADV

## (undated) DEVICE — KIT,ANTI FOG,W/SPONGE & FLUID,SOFT PACK: Brand: MEDLINE

## (undated) DEVICE — INTENDED FOR TISSUE SEPARATION, AND OTHER PROCEDURES THAT REQUIRE A SHARP SURGICAL BLADE TO PUNCTURE OR CUT.: Brand: BARD-PARKER ® STAINLESS STEEL BLADES

## (undated) DEVICE — TROCAR: Brand: KII FIOS FIRST ENTRY

## (undated) DEVICE — CIRCULAR MECH XL SEAL 29MM

## (undated) DEVICE — SIGMOIDOSCOPE LIGHTED BIOSEAL

## (undated) DEVICE — TRAY CATH FOLEY 16FR INCLUDE BARDX IC COMPLT CARE

## (undated) DEVICE — MEDI-VAC NON-CONDUCTIVE SUCTION TUBING: Brand: CARDINAL HEALTH

## (undated) DEVICE — ANTIBACTERIAL UNDYED BRAIDED (POLYGLACTIN 910), SYNTHETIC ABSORBABLE SUTURE: Brand: COATED VICRYL

## (undated) DEVICE — TROCAR: Brand: KII® SLEEVE

## (undated) DEVICE — JELLY,LUBE,STERILE,FLIP TOP,TUBE,2-OZ: Brand: MEDLINE

## (undated) DEVICE — ADHESIVE LIQ 2/3ML VI MASTISOL

## (undated) DEVICE — DRAPE,UNDRBUT,WHT GRAD PCH,CAPPORT,20/CS: Brand: MEDLINE

## (undated) DEVICE — GOWN,SIRUS,FAB REINF,RAGLAN,XL,STERILE: Brand: MEDLINE

## (undated) DEVICE — E-Z CLEAN, NON-STICK, PTFE COATED, ELECTROSURGICAL NEEDLE ELECTRODE, MODIFIED EXTENDED INSULATION, 2.75 INCH (7 CM): Brand: MEGADYNE

## (undated) DEVICE — TOWEL,OR,DSP,ST,BLUE,DLX,2/PK,40PK/CS: Brand: MEDLINE

## (undated) DEVICE — MINOR GENERAL: Brand: MEDLINE INDUSTRIES, INC.

## (undated) DEVICE — INTENDED USE FOR SURGICAL MARKING ON INTACT SKIN, ALSO PROVIDES A PERMANENT METHOD OF IDENTIFYING OBJECTS IN THE OPERATING ROOM: Brand: WRITESITE® PLUS MINI PREP RESISTANT MARKER

## (undated) DEVICE — DRAPE,ABDOMINAL,MAJOR,STERILE: Brand: MEDLINE

## (undated) DEVICE — SUT CHRM GUT 0 27IN CT ABSRB UD 40MM 1/2

## (undated) DEVICE — LEGGINGS, PAIR, 31X48, STERILE: Brand: MEDLINE

## (undated) DEVICE — TROCARS: Brand: KII® BALLOON BLUNT TIP SYSTEM

## (undated) DEVICE — VISUALIZATION SYSTEM: Brand: CLEARIFY

## (undated) DEVICE — ELECTRODE ES 2.75IN PTFE BLDE MOD E-Z CLN

## (undated) DEVICE — SUT PERMA- 0 30IN NABSRB BLK TIE SILK

## (undated) DEVICE — SUT PDS II 1 36IN ABSRB VLT L36MM CT-1

## (undated) DEVICE — DISPOSABLE SUCTION/IRRIGATOR TUBE SET: Brand: AHTO

## (undated) DEVICE — SUT PDS II 0 27IN CT-1 ABSRB VLT L36MM 1/2

## (undated) DEVICE — UNDYED BRAIDED (POLYGLACTIN 910), SYNTHETIC ABSORBABLE SUTURE: Brand: COATED VICRYL

## (undated) DEVICE — SOLUTION IRRIG 1000ML 0.9% NACL USP BTL

## (undated) DEVICE — DISPOSABLE GRASPER CARTRIDGE: Brand: DIRECT DRIVE REPOSABLE GRASPERS

## (undated) DEVICE — 3M™ IOBAN™ 2 ANTIMICROBIAL INCISE DRAPE 6650EZ: Brand: IOBAN™ 2

## (undated) DEVICE — DISPOSABLE GRASPER: Brand: EPIX LAPAROSCOPIC GRASPER

## (undated) DEVICE — GAMMEX® PI HYBRID SIZE 7.5, STERILE POWDER-FREE SURGICAL GLOVE, POLYISOPRENE AND NEOPRENE BLEND: Brand: GAMMEX

## (undated) DEVICE — UNIVERSAL STAPLER: Brand: ENDO GIA ULTRA

## (undated) DEVICE — POWER SHELL: Brand: SIGNIA

## (undated) DEVICE — LAPAROSCOPIC ACCESS SYSTEM: Brand: ALEXIS LAPAROSCOPIC SYSTEM

## (undated) DEVICE — PAD POS 36IN DISP SURGYPAD